# Patient Record
Sex: MALE | Race: BLACK OR AFRICAN AMERICAN | Employment: OTHER | URBAN - NONMETROPOLITAN AREA
[De-identification: names, ages, dates, MRNs, and addresses within clinical notes are randomized per-mention and may not be internally consistent; named-entity substitution may affect disease eponyms.]

---

## 2023-01-24 ENCOUNTER — HOSPITAL ENCOUNTER (EMERGENCY)
Age: 73
Discharge: HOME | End: 2023-01-24
Attending: FAMILY MEDICINE
Payer: OTHER GOVERNMENT

## 2023-01-24 ENCOUNTER — APPOINTMENT (OUTPATIENT)
Dept: GENERAL RADIOLOGY | Age: 73
End: 2023-01-24
Attending: FAMILY MEDICINE
Payer: OTHER GOVERNMENT

## 2023-01-24 VITALS
OXYGEN SATURATION: 95 % | DIASTOLIC BLOOD PRESSURE: 95 MMHG | HEIGHT: 74 IN | RESPIRATION RATE: 20 BRPM | HEART RATE: 106 BPM | TEMPERATURE: 97.9 F | WEIGHT: 173 LBS | SYSTOLIC BLOOD PRESSURE: 163 MMHG | BODY MASS INDEX: 22.2 KG/M2

## 2023-01-24 DIAGNOSIS — R09.82 POST-NASAL DRIP: Primary | ICD-10-CM

## 2023-01-24 DIAGNOSIS — J04.0 LARYNGITIS: ICD-10-CM

## 2023-01-24 PROCEDURE — 99283 EMERGENCY DEPT VISIT LOW MDM: CPT

## 2023-01-24 PROCEDURE — 71045 X-RAY EXAM CHEST 1 VIEW: CPT

## 2023-01-24 RX ORDER — AMLODIPINE BESYLATE 10 MG/1
10 TABLET ORAL DAILY
COMMUNITY

## 2023-01-24 RX ORDER — FLUTICASONE PROPIONATE 50 MCG
2 SPRAY, SUSPENSION (ML) NASAL DAILY
Qty: 16 G | Refills: 0 | Status: SHIPPED | OUTPATIENT
Start: 2023-01-24

## 2023-01-25 NOTE — ED PROVIDER NOTES
EMERGENCY DEPARTMENT HISTORY AND PHYSICAL EXAM      Date: 1/24/2023  Patient Name: Curt Mayfield    History of Presenting Illness     Chief Complaint   Patient presents with    Cold Symptoms       History Provided By: Patient    HPI: Curt Mayfield, 71 y.o. male with a past medical history significant hypertension and afib on eliquis  presents to the ED with cc of cold symptoms. Reports cold symptoms for the last couple of weeks. Mainly hoarseness and losing his voice. No sore throat, no fever. Denies N/V/D. Appetite is good, does report fatigue has been taking OTC meds with no relief. 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 He has atrial fibrillation but takes medications for this. Denies any chest pain, palpitations, shortness of breath. Does occasionally get winded with physical exertion but that is nothing new. Does feel like he gets postnasal drip and that this happens more so when he is laying down and sleeping at night. Does not take any medications for this. No recent travel or exposure to sick people. No fevers. No numbness, tingling, headaches, falls. Otherwise feels great for his age. There are no other complaints, changes, or physical findings at this time. PCP: None    No current facility-administered medications on file prior to encounter. Current Outpatient Medications on File Prior to Encounter   Medication Sig Dispense Refill    apixaban (Eliquis) 5 mg tablet Take 5 mg by mouth two (2) times a day. amLODIPine (NORVASC) 10 mg tablet Take 10 mg by mouth daily. Past History     Past Medical History:  Past Medical History:   Diagnosis Date    A-fib ([facility] [address]     HTN (hypertension)        Past Surgical History:  No past surgical history on file. Family History:  No family history on file.     Social History:  Social History     Tobacco Use    Smoking status: Every Day     Packs/day: 0.25     Types: Cigarettes Smokeless tobacco: Never       Allergies:  No Known Allergies      Review of Systems   ROS as stated above in HPI and is otherwise negative. Review of Systems    Physical Exam   GENERAL: Afebrile. Alert and oriented x 3. No acute distress. Well-nourished. EYES: EOMI. Anicteric. HENT: Moist mucous membranes. No scleral icterus. No cervical lymphadenopathy. Voice a bit hoarse. Some posterior erythema consistent with post nasal drip. LUNGS: Chest rise and fall symmetric. O2 sat 96% on room air. Clear to auscultation bilaterally. No accessory muscle use. CARDIOVASCULAR: Regular rate and rhythm. No murmur. No JVD. ABDOMEN: Soft, non-tender and non-distended. No palpable masses. EXTREMITIES: No edema. Non-tender. SKIN: No rashes or lesions. Warm. NEUROLOGIC: No focal neurological deficits. CN II-XII grossly intact bilaterally. PSYCHIATRIC: Cooperative. Appropriate mood and affect. Physical Exam    Lab and Diagnostic Study Results     Labs -   No results found for this or any previous visit (from the past 12 hour(s)). Radiologic Studies -   @lastxrresult@  CT Results  (Last 48 hours)      None          CXR Results  (Last 48 hours)                 01/24/23 2104  XR CHEST PORT Final result    Impression:  No acute cardiopulmonary process seen       Narrative:  EXAM: XR CHEST PORT       INDICATION: hoarseness, cold       COMPARISON: 4/17/2020       FINDINGS: A portable AP radiograph of the chest was obtained at 2102 hours. The   right apical bulla has remained stable. The lungs are clear. The cardiac and   mediastinal contours and pulmonary vascularity are normal.  The bones and soft   tissues are grossly within normal limits. Medical Decision Making   - I am the first provider for this patient. - I reviewed the vital signs, available nursing notes, past medical history, past surgical history, family history and social history. - Initial assessment performed.  The patients presenting problems have been discussed, and they are in agreement with the care plan formulated and outlined with them. I have encouraged them to ask questions as they arise throughout their visit. Vital Signs-Reviewed the patient's vital signs. Patient Vitals for the past 12 hrs:   Temp Pulse Resp BP SpO2   01/24/23 2026 97.9 °F (36.6 °C) (!) 106 20 (!) 163/95 95 %       Records Reviewed: Nursing Notes and Old Medical Records    The patient presents with hoarseness with a differential diagnosis of laryngitis, pneumonia, GERD, asthma, bronchitis      ED Course:     ED Course as of 01/25/23 0516 Tue Jan 24, 2023 2128 EXAM: XR CHEST PORT     INDICATION: hoarseness, cold     COMPARISON: 4/17/2020     FINDINGS: A portable AP radiograph of the chest was obtained at 2102 hours. The  right apical bulla has remained stable. The lungs are clear. The cardiac and  mediastinal contours and pulmonary vascularity are normal.  The bones and soft  tissues are grossly within normal limits. IMPRESSION  No acute cardiopulmonary process seen [OM]      ED Course User Index  [OM] [place] Small DO       Provider Notes (Medical Decision Making):   80-year-old male with history of A. fib and otherwise well for his age presents ER with chief complaint of some fatigue and voice hoarseness. He has stable vital signs and a reassuring exam with signs of postnasal drip and some sinus inflammation. His chest x-ray shows no acute findings and he is counseled on postnasal drip. He shows no signs of ACS, CHF, A. fib with RVR. He states he otherwise feels great for his age. He is given positive reassurance and supportive measures for postnasal drip and viral sinusitis are discussed. He is started on a Nasonex nasal inhaler and directions for how to use are discussed with him.   He is also advised to follow-up with primary care provider and ensure he follows up with his cardiologist.  [address] Decision Making  Performed by: Regan Mccormick DO  PROCEDURES:  Procedures       Disposition   Disposition: Condition stable  DC-The patient was given verbal follow-up instructions    Discharged    DISCHARGE PLAN:  1. Current Discharge Medication List        CONTINUE these medications which have NOT CHANGED    Details   apixaban (Eliquis) 5 mg tablet Take 5 mg by mouth two (2) times a day. amLODIPine (NORVASC) 10 mg tablet Take 10 mg by mouth daily. 2.   Follow-up Information       Follow up With Specialties Details Why Contact [facility] EMERGENCY DEPT Emergency Medicine  As needed, If symptoms worsen [facility] [phone] [phone]          3. Return to ED if worse   4. Discharge Medication List as of 1/24/2023  9:37 PM        START taking these medications    Details   fluticasone propionate (FLONASE) 50 mcg/actuation nasal spray 2 Sprays by Both Nostrils route daily. , Normal, Disp-16 g, R-0           CONTINUE these medications which have NOT CHANGED    Details   apixaban (Eliquis) 5 mg tablet Take 5 mg by mouth two (2) times a day., Historical Med      amLODIPine (NORVASC) 10 mg tablet Take 10 mg by mouth daily. , Historical Med               Diagnosis     Clinical Impression:   1. Post-nasal drip    2. Laryngitis        Attestations:    Regan Mccormick DO    Please note that this dictation was completed with [employer], the computer voice recognition software. Quite often unanticipated grammatical, syntax, homophones, and other interpretive errors are inadvertently transcribed by the computer software. Please disregard these errors. Please excuse any errors that have escaped final proofreading. Thank you.

## 2023-01-25 NOTE — ED TRIAGE NOTES
Reports cold symptoms for the last couple of weeks. Mainly hoarseness and losing his voice. No sore throat, no fever. Denies N/V/D.  Appetite is good, does report fatigue has been taking OTC meds with no relief

## 2024-01-22 ENCOUNTER — APPOINTMENT (OUTPATIENT)
Age: 74
End: 2024-01-22
Payer: MEDICARE

## 2024-01-22 ENCOUNTER — HOSPITAL ENCOUNTER (EMERGENCY)
Age: 74
Discharge: HOME | End: 2024-01-22
Attending: EMERGENCY MEDICINE
Payer: MEDICARE

## 2024-01-22 VITALS
SYSTOLIC BLOOD PRESSURE: 145 MMHG | HEIGHT: 74 IN | RESPIRATION RATE: 13 BRPM | TEMPERATURE: 98.1 F | OXYGEN SATURATION: 100 % | DIASTOLIC BLOOD PRESSURE: 92 MMHG | WEIGHT: 134 LBS | BODY MASS INDEX: 17.2 KG/M2 | HEART RATE: 94 BPM

## 2024-01-22 DIAGNOSIS — R16.0 LIVER MASS: ICD-10-CM

## 2024-01-22 DIAGNOSIS — R07.9 CHEST PAIN, UNSPECIFIED TYPE: Primary | ICD-10-CM

## 2024-01-22 DIAGNOSIS — C34.90 PRIMARY MALIGNANT NEOPLASM OF LUNG METASTATIC TO OTHER SITE, UNSPECIFIED LATERALITY (HCC): ICD-10-CM

## 2024-01-22 DIAGNOSIS — R91.8 HILAR MASS: ICD-10-CM

## 2024-01-22 LAB
ANION GAP SERPL CALC-SCNC: 8 MMOL/L (ref 3–18)
BASOPHILS # BLD: 0 K/UL (ref 0–0.1)
BASOPHILS NFR BLD: 0 % (ref 0–2)
BUN SERPL-MCNC: 11 MG/DL (ref 7–18)
BUN/CREAT SERPL: 11 (ref 12–20)
CA-I BLD-MCNC: 10.7 MG/DL (ref 8.5–10.1)
CHLORIDE SERPL-SCNC: 103 MMOL/L (ref 100–111)
CO2 SERPL-SCNC: 27 MMOL/L (ref 21–32)
CREAT SERPL-MCNC: 0.96 MG/DL (ref 0.6–1.3)
D DIMER PPP FEU-MCNC: 1.08 UG/ML(FEU)
DIFFERENTIAL METHOD BLD: ABNORMAL
EOSINOPHIL # BLD: 0 K/UL (ref 0–0.4)
EOSINOPHIL NFR BLD: 0 % (ref 0–5)
ERYTHROCYTE [DISTWIDTH] IN BLOOD BY AUTOMATED COUNT: 18.8 % (ref 11.6–14.5)
GLUCOSE SERPL-MCNC: 103 MG/DL (ref 74–99)
HCT VFR BLD AUTO: 40.5 % (ref 36–48)
HGB BLD-MCNC: 12.6 G/DL (ref 13–16)
IMM GRANULOCYTES # BLD AUTO: 0 K/UL (ref 0–0.04)
IMM GRANULOCYTES NFR BLD AUTO: 0 % (ref 0–0.5)
LYMPHOCYTES # BLD: 1.3 K/UL (ref 0.9–3.6)
LYMPHOCYTES NFR BLD: 16 % (ref 21–52)
MCH RBC QN AUTO: 27.2 PG (ref 24–34)
MCHC RBC AUTO-ENTMCNC: 31.1 G/DL (ref 31–37)
MCV RBC AUTO: 87.5 FL (ref 78–100)
MONOCYTES # BLD: 0.9 K/UL (ref 0.05–1.2)
MONOCYTES NFR BLD: 11 % (ref 3–10)
NEUTS SEG # BLD: 5.9 K/UL (ref 1.8–8)
NEUTS SEG NFR BLD: 73 % (ref 40–73)
NRBC # BLD: 0 K/UL (ref 0–0.01)
NRBC BLD-RTO: 0 PER 100 WBC
PLATELET # BLD AUTO: 304 K/UL (ref 135–420)
PMV BLD AUTO: 8.5 FL (ref 9.2–11.8)
POTASSIUM SERPL-SCNC: 3.9 MMOL/L (ref 3.5–5.5)
RBC # BLD AUTO: 4.63 M/UL (ref 4.35–5.65)
SODIUM SERPL-SCNC: 138 MMOL/L (ref 136–145)
TROPONIN I SERPL HS-MCNC: 7 NG/L (ref 0–78)
TROPONIN I SERPL HS-MCNC: 8 NG/L (ref 0–78)
WBC # BLD AUTO: 8.2 K/UL (ref 4.6–13.2)

## 2024-01-22 PROCEDURE — 71275 CT ANGIOGRAPHY CHEST: CPT

## 2024-01-22 PROCEDURE — 85025 COMPLETE CBC W/AUTO DIFF WBC: CPT

## 2024-01-22 PROCEDURE — 80048 BASIC METABOLIC PNL TOTAL CA: CPT

## 2024-01-22 PROCEDURE — 85379 FIBRIN DEGRADATION QUANT: CPT

## 2024-01-22 PROCEDURE — 71045 X-RAY EXAM CHEST 1 VIEW: CPT

## 2024-01-22 PROCEDURE — 6360000004 HC RX CONTRAST MEDICATION: Performed by: EMERGENCY MEDICINE

## 2024-01-22 PROCEDURE — 6370000000 HC RX 637 (ALT 250 FOR IP): Performed by: EMERGENCY MEDICINE

## 2024-01-22 PROCEDURE — 36415 COLL VENOUS BLD VENIPUNCTURE: CPT

## 2024-01-22 PROCEDURE — 99285 EMERGENCY DEPT VISIT HI MDM: CPT

## 2024-01-22 PROCEDURE — 2580000003 HC RX 258: Performed by: EMERGENCY MEDICINE

## 2024-01-22 PROCEDURE — 93005 ELECTROCARDIOGRAM TRACING: CPT | Performed by: EMERGENCY MEDICINE

## 2024-01-22 PROCEDURE — 84484 ASSAY OF TROPONIN QUANT: CPT

## 2024-01-22 PROCEDURE — 74177 CT ABD & PELVIS W/CONTRAST: CPT

## 2024-01-22 RX ORDER — BENZONATATE 100 MG/1
100 CAPSULE ORAL 3 TIMES DAILY PRN
COMMUNITY

## 2024-01-22 RX ORDER — MIRTAZAPINE 30 MG/1
30 TABLET, FILM COATED ORAL NIGHTLY
COMMUNITY

## 2024-01-22 RX ORDER — MAGNESIUM HYDROXIDE/ALUMINUM HYDROXICE/SIMETHICONE 120; 1200; 1200 MG/30ML; MG/30ML; MG/30ML
30 SUSPENSION ORAL
Status: COMPLETED | OUTPATIENT
Start: 2024-01-22 | End: 2024-01-22

## 2024-01-22 RX ORDER — ATORVASTATIN CALCIUM 80 MG/1
80 TABLET, FILM COATED ORAL DAILY
COMMUNITY

## 2024-01-22 RX ORDER — SODIUM CHLORIDE, SODIUM LACTATE, POTASSIUM CHLORIDE, AND CALCIUM CHLORIDE .6; .31; .03; .02 G/100ML; G/100ML; G/100ML; G/100ML
1000 INJECTION, SOLUTION INTRAVENOUS ONCE
Status: COMPLETED | OUTPATIENT
Start: 2024-01-22 | End: 2024-01-22

## 2024-01-22 RX ORDER — AMLODIPINE BESYLATE 10 MG/1
10 TABLET ORAL DAILY
COMMUNITY

## 2024-01-22 RX ADMIN — SODIUM CHLORIDE, POTASSIUM CHLORIDE, SODIUM LACTATE AND CALCIUM CHLORIDE 1000 ML: 600; 310; 30; 20 INJECTION, SOLUTION INTRAVENOUS at 18:05

## 2024-01-22 RX ADMIN — IOPAMIDOL 95 ML: 755 INJECTION, SOLUTION INTRAVENOUS at 18:08

## 2024-01-22 RX ADMIN — ALUMINUM HYDROXIDE, MAGNESIUM HYDROXIDE, AND SIMETHICONE 30 ML: 200; 200; 20 SUSPENSION ORAL at 19:02

## 2024-01-22 ASSESSMENT — PAIN SCALES - GENERAL: PAINLEVEL_OUTOF10: 6

## 2024-01-22 NOTE — ED TRIAGE NOTES
Patient reports chest pain, continuous, for one week. Reports that he has lung and liver cancer with last chemotherapy was 1/18/24

## 2024-01-22 NOTE — ED PROVIDER NOTES
[facility] EMERGENCY DEPT  EMERGENCY DEPARTMENT ENCOUNTER      Pt Name: Javi Carbajal  MRN: [id number]  Birthdate 1950  Date of evaluation: 1/22/2024  Provider: Roel Medellin MD  8:41 AM    CHIEF COMPLAINT       Chief Complaint   Patient presents with    Chest Pain     For a week         HISTORY OF PRESENT ILLNESS    Javi Carbajal is a 73 y.o. male with past medical history significant for metastatic lung cancer on palliation chemotherapy at the [facility] with last infusion on January 18 who presents to the emergency department for evaluation of chest discomfort for the last 1 week.  Gradual onset, atraumatic nonradiating.  No positional or exertion association, food or drug association.  No clear aggravating factors.  No change to the character or severity.    The history is provided by the patient, the spouse and medical records.       Nursing Notes were reviewed.    REVIEW OF SYSTEMS       Review of Systems   All other systems reviewed and are negative.      Except as noted above the remainder of the review of systems was reviewed and negative.       PAST MEDICAL HISTORY     Past Medical History:   Diagnosis Date    A-fib (HCC)     HTN (hypertension)     Liver cancer (HCC)     Lung cancer (HCC)          SURGICAL HISTORY     History reviewed. No pertinent surgical history.      CURRENT MEDICATIONS       Discharge Medication List as of 1/22/2024  7:58 PM        CONTINUE these medications which have NOT CHANGED    Details   atorvastatin (LIPITOR) 80 MG tablet Take 1 tablet by mouth dailyHistorical Med      benzonatate (TESSALON) 100 MG capsule Take 1 capsule by mouth 3 times daily as needed for CoughHistorical Med      apixaban (ELIQUIS) 5 MG TABS tablet Take 1 tablet by mouth 2 times dailyHistorical Med      amLODIPine (NORVASC) 10 MG tablet Take 1 tablet by mouth dailyHistorical Med      mirtazapine (REMERON) 30 MG tablet Take 1 tablet by mouth nightlyHistorical Med             ALLERGIES     Patient has no

## 2024-01-22 NOTE — DISCHARGE INSTRUCTIONS
You had a CBC, basic metabolic panel and to blood test for your heart as well as a CT of the chest abdomen pelvis.  We have provided you a copy of the CTs on a CD to share with both your oncologist and radiologist at the .

## 2024-01-23 LAB
EKG ATRIAL RATE: 116 BPM
EKG DIAGNOSIS: NORMAL
EKG P AXIS: 73 DEGREES
EKG P-R INTERVAL: 128 MS
EKG Q-T INTERVAL: 300 MS
EKG QRS DURATION: 74 MS
EKG QTC CALCULATION (BAZETT): 417 MS
EKG R AXIS: 74 DEGREES
EKG T AXIS: 4 DEGREES
EKG VENTRICULAR RATE: 116 BPM

## 2024-01-23 NOTE — ED NOTES
Assumed care of pt resting on stretcher using personal electronic device . Pt medicated  by previous nurse cardiac monitor in place. Pt reports mid sternal CP 6/10 after medication. 1L NS bolus infusing. Pt ambulatory to restroom voids freely w/o incident.

## 2024-02-02 ENCOUNTER — HOSPITAL ENCOUNTER (INPATIENT)
Age: 74
LOS: 5 days | Discharge: HOME | DRG: 871 | End: 2024-02-07
Attending: FAMILY MEDICINE | Admitting: INTERNAL MEDICINE
Payer: MEDICARE

## 2024-02-02 ENCOUNTER — APPOINTMENT (OUTPATIENT)
Age: 74
DRG: 871 | End: 2024-02-02
Payer: MEDICARE

## 2024-02-02 DIAGNOSIS — A41.9 SEPTICEMIA (HCC): ICD-10-CM

## 2024-02-02 DIAGNOSIS — J18.9 PNEUMONIA DUE TO INFECTIOUS ORGANISM, UNSPECIFIED LATERALITY, UNSPECIFIED PART OF LUNG: Primary | ICD-10-CM

## 2024-02-02 DIAGNOSIS — I47.10 PAROXYSMAL SUPRAVENTRICULAR TACHYCARDIA: ICD-10-CM

## 2024-02-02 DIAGNOSIS — E87.6 HYPOKALEMIA: ICD-10-CM

## 2024-02-02 DIAGNOSIS — I48.21 PERMANENT ATRIAL FIBRILLATION (HCC): ICD-10-CM

## 2024-02-02 DIAGNOSIS — Z85.05 HISTORY OF LIVER CANCER: ICD-10-CM

## 2024-02-02 DIAGNOSIS — Z85.118 HISTORY OF LUNG CANCER: ICD-10-CM

## 2024-02-02 LAB
ALBUMIN SERPL-MCNC: 2.6 G/DL (ref 3.4–5)
ALBUMIN/GLOB SERPL: 0.5 (ref 0.8–1.7)
ALP SERPL-CCNC: 68 U/L (ref 45–117)
ALT SERPL-CCNC: 12 U/L (ref 16–61)
AMPHET UR QL SCN: NEGATIVE
ANION GAP SERPL CALC-SCNC: 9 MMOL/L (ref 3–18)
APPEARANCE UR: CLEAR
AST SERPL W P-5'-P-CCNC: 20 U/L (ref 10–38)
BACTERIA URNS QL MICRO: ABNORMAL /HPF
BARBITURATES UR QL SCN: NEGATIVE
BASOPHILS # BLD: 0 K/UL (ref 0–0.1)
BASOPHILS NFR BLD: 0 % (ref 0–2)
BENZODIAZ UR QL: NEGATIVE
BILIRUB SERPL-MCNC: 0.8 MG/DL (ref 0.2–1)
BILIRUB UR QL: NEGATIVE
BUN SERPL-MCNC: 17 MG/DL (ref 7–18)
BUN/CREAT SERPL: 18 (ref 12–20)
CA-I BLD-MCNC: 10 MG/DL (ref 8.5–10.1)
CANNABINOIDS UR QL SCN: NEGATIVE
CHLORIDE SERPL-SCNC: 101 MMOL/L (ref 100–111)
CO2 SERPL-SCNC: 28 MMOL/L (ref 21–32)
COCAINE UR QL SCN: NEGATIVE
COLOR UR: YELLOW
CREAT SERPL-MCNC: 0.94 MG/DL (ref 0.6–1.3)
D DIMER PPP FEU-MCNC: 2.52 UG/ML(FEU)
DIFFERENTIAL METHOD BLD: ABNORMAL
EKG ATRIAL RATE: 120 BPM
EKG ATRIAL RATE: 159 BPM
EKG DIAGNOSIS: NORMAL
EKG DIAGNOSIS: NORMAL
EKG P AXIS: 52 DEGREES
EKG P AXIS: 88 DEGREES
EKG P-R INTERVAL: 144 MS
EKG P-R INTERVAL: 182 MS
EKG Q-T INTERVAL: 268 MS
EKG Q-T INTERVAL: 322 MS
EKG QRS DURATION: 80 MS
EKG QRS DURATION: 82 MS
EKG QTC CALCULATION (BAZETT): 435 MS
EKG QTC CALCULATION (BAZETT): 455 MS
EKG R AXIS: 76 DEGREES
EKG R AXIS: 78 DEGREES
EKG T AXIS: -42 DEGREES
EKG T AXIS: 36 DEGREES
EKG VENTRICULAR RATE: 120 BPM
EKG VENTRICULAR RATE: 159 BPM
EOSINOPHIL # BLD: 0.1 K/UL (ref 0–0.4)
EOSINOPHIL NFR BLD: 1 % (ref 0–5)
EPITH CASTS URNS QL MICRO: ABNORMAL /LPF (ref 0–20)
ERYTHROCYTE [DISTWIDTH] IN BLOOD BY AUTOMATED COUNT: 19.5 % (ref 11.6–14.5)
FENTANYL UR QL SCN: NEGATIVE
FLUAV AG NPH QL IA: NEGATIVE
FLUBV AG NOSE QL IA: NEGATIVE
GLOBULIN SER CALC-MCNC: 5 G/DL (ref 2–4)
GLUCOSE SERPL-MCNC: 120 MG/DL (ref 74–99)
GLUCOSE UR STRIP.AUTO-MCNC: NEGATIVE MG/DL
HCT VFR BLD AUTO: 44.6 % (ref 36–48)
HGB BLD-MCNC: 14.5 G/DL (ref 13–16)
HGB UR QL STRIP: NEGATIVE
HYALINE CASTS URNS QL MICRO: ABNORMAL /LPF
IMM GRANULOCYTES # BLD AUTO: 0 K/UL (ref 0–0.04)
IMM GRANULOCYTES NFR BLD AUTO: 0 % (ref 0–0.5)
KETONES UR QL STRIP.AUTO: NEGATIVE MG/DL
LACTATE SERPL-SCNC: 1.9 MMOL/L (ref 0.4–2)
LACTATE SERPL-SCNC: 2.5 MMOL/L (ref 0.4–2)
LEUKOCYTE ESTERASE UR QL STRIP.AUTO: NEGATIVE
LYMPHOCYTES # BLD: 0.3 K/UL (ref 0.9–3.6)
LYMPHOCYTES NFR BLD: 2 % (ref 21–52)
Lab: NORMAL
MAGNESIUM SERPL-MCNC: 1.6 MG/DL (ref 1.6–2.6)
MCH RBC QN AUTO: 28 PG (ref 24–34)
MCHC RBC AUTO-ENTMCNC: 32.5 G/DL (ref 31–37)
MCV RBC AUTO: 86.1 FL (ref 78–100)
METHADONE UR QL: NEGATIVE
MONOCYTES # BLD: 0.7 K/UL (ref 0.05–1.2)
MONOCYTES NFR BLD: 6 % (ref 3–10)
NEUTS SEG # BLD: 11.3 K/UL (ref 1.8–8)
NEUTS SEG NFR BLD: 91 % (ref 40–73)
NITRITE UR QL STRIP.AUTO: NEGATIVE
NRBC # BLD: 0 K/UL (ref 0–0.01)
NRBC BLD-RTO: 0 PER 100 WBC
OPIATES UR QL: NEGATIVE
OXYCODONE UR QL SCN: NEGATIVE
PCP UR QL: NEGATIVE
PH UR STRIP: 6.5 (ref 5–8)
PLATELET # BLD AUTO: 418 K/UL (ref 135–420)
PMV BLD AUTO: 9.6 FL (ref 9.2–11.8)
POTASSIUM SERPL-SCNC: 3.3 MMOL/L (ref 3.5–5.5)
PROCALCITONIN SERPL-MCNC: 3.13 NG/ML
PROPOXYPH UR QL: NEGATIVE
PROT SERPL-MCNC: 7.6 G/DL (ref 6.4–8.2)
PROT UR STRIP-MCNC: 30 MG/DL
RBC # BLD AUTO: 5.18 M/UL (ref 4.35–5.65)
RBC #/AREA URNS HPF: ABNORMAL /HPF (ref 0–2)
SARS-COV-2 RDRP RESP QL NAA+PROBE: NOT DETECTED
SODIUM SERPL-SCNC: 138 MMOL/L (ref 136–145)
SP GR UR REFRACTOMETRY: 1.01 (ref 1–1.03)
TRICYCLICS UR QL: NEGATIVE
TROPONIN I SERPL HS-MCNC: 39 NG/L (ref 0–78)
TROPONIN I SERPL HS-MCNC: 45 NG/L (ref 0–78)
UROBILINOGEN UR QL STRIP.AUTO: 1 EU/DL (ref 0.2–1)
WBC # BLD AUTO: 12.5 K/UL (ref 4.6–13.2)
WBC URNS QL MICRO: ABNORMAL /HPF (ref 0–4)

## 2024-02-02 PROCEDURE — 2500000003 HC RX 250 WO HCPCS

## 2024-02-02 PROCEDURE — 6360000004 HC RX CONTRAST MEDICATION: Performed by: FAMILY MEDICINE

## 2024-02-02 PROCEDURE — 84146 ASSAY OF PROLACTIN: CPT

## 2024-02-02 PROCEDURE — 96375 TX/PRO/DX INJ NEW DRUG ADDON: CPT

## 2024-02-02 PROCEDURE — 83735 ASSAY OF MAGNESIUM: CPT

## 2024-02-02 PROCEDURE — 87635 SARS-COV-2 COVID-19 AMP PRB: CPT

## 2024-02-02 PROCEDURE — 2500000003 HC RX 250 WO HCPCS: Performed by: FAMILY MEDICINE

## 2024-02-02 PROCEDURE — 2700000000 HC OXYGEN THERAPY PER DAY

## 2024-02-02 PROCEDURE — 96366 THER/PROPH/DIAG IV INF ADDON: CPT

## 2024-02-02 PROCEDURE — 6360000002 HC RX W HCPCS

## 2024-02-02 PROCEDURE — 83605 ASSAY OF LACTIC ACID: CPT

## 2024-02-02 PROCEDURE — 84145 PROCALCITONIN (PCT): CPT

## 2024-02-02 PROCEDURE — 2580000003 HC RX 258: Performed by: NURSE PRACTITIONER

## 2024-02-02 PROCEDURE — 71045 X-RAY EXAM CHEST 1 VIEW: CPT

## 2024-02-02 PROCEDURE — 5A0935A ASSISTANCE WITH RESPIRATORY VENTILATION, LESS THAN 24 CONSECUTIVE HOURS, HIGH NASAL FLOW/VELOCITY: ICD-10-PCS | Performed by: FAMILY MEDICINE

## 2024-02-02 PROCEDURE — 87804 INFLUENZA ASSAY W/OPTIC: CPT

## 2024-02-02 PROCEDURE — 96376 TX/PRO/DX INJ SAME DRUG ADON: CPT

## 2024-02-02 PROCEDURE — 6360000002 HC RX W HCPCS: Performed by: NURSE PRACTITIONER

## 2024-02-02 PROCEDURE — 2000000000 HC ICU R&B

## 2024-02-02 PROCEDURE — 85379 FIBRIN DEGRADATION QUANT: CPT

## 2024-02-02 PROCEDURE — 93005 ELECTROCARDIOGRAM TRACING: CPT | Performed by: FAMILY MEDICINE

## 2024-02-02 PROCEDURE — 71275 CT ANGIOGRAPHY CHEST: CPT

## 2024-02-02 PROCEDURE — 36556 INSERT NON-TUNNEL CV CATH: CPT

## 2024-02-02 PROCEDURE — 2580000003 HC RX 258: Performed by: FAMILY MEDICINE

## 2024-02-02 PROCEDURE — 80053 COMPREHEN METABOLIC PANEL: CPT

## 2024-02-02 PROCEDURE — 80307 DRUG TEST PRSMV CHEM ANLYZR: CPT

## 2024-02-02 PROCEDURE — 6360000002 HC RX W HCPCS: Performed by: FAMILY MEDICINE

## 2024-02-02 PROCEDURE — 96365 THER/PROPH/DIAG IV INF INIT: CPT

## 2024-02-02 PROCEDURE — 5A2204Z RESTORATION OF CARDIAC RHYTHM, SINGLE: ICD-10-PCS | Performed by: FAMILY MEDICINE

## 2024-02-02 PROCEDURE — 85025 COMPLETE CBC W/AUTO DIFF WBC: CPT

## 2024-02-02 PROCEDURE — 99291 CRITICAL CARE FIRST HOUR: CPT

## 2024-02-02 PROCEDURE — 84484 ASSAY OF TROPONIN QUANT: CPT

## 2024-02-02 PROCEDURE — 02HV33Z INSERTION OF INFUSION DEVICE INTO SUPERIOR VENA CAVA, PERCUTANEOUS APPROACH: ICD-10-PCS | Performed by: FAMILY MEDICINE

## 2024-02-02 PROCEDURE — 87040 BLOOD CULTURE FOR BACTERIA: CPT

## 2024-02-02 PROCEDURE — 6370000000 HC RX 637 (ALT 250 FOR IP): Performed by: NURSE PRACTITIONER

## 2024-02-02 PROCEDURE — 94761 N-INVAS EAR/PLS OXIMETRY MLT: CPT

## 2024-02-02 PROCEDURE — 81001 URINALYSIS AUTO W/SCOPE: CPT

## 2024-02-02 RX ORDER — ADENOSINE 3 MG/ML
6 INJECTION, SOLUTION INTRAVENOUS ONCE
Status: COMPLETED | OUTPATIENT
Start: 2024-02-02 | End: 2024-02-02

## 2024-02-02 RX ORDER — METOPROLOL TARTRATE 1 MG/ML
INJECTION, SOLUTION INTRAVENOUS
Status: COMPLETED
Start: 2024-02-02 | End: 2024-02-02

## 2024-02-02 RX ORDER — 0.9 % SODIUM CHLORIDE 0.9 %
500 INTRAVENOUS SOLUTION INTRAVENOUS ONCE
Status: COMPLETED | OUTPATIENT
Start: 2024-02-02 | End: 2024-02-02

## 2024-02-02 RX ORDER — DILTIAZEM HYDROCHLORIDE 5 MG/ML
10 INJECTION INTRAVENOUS ONCE
Status: COMPLETED | OUTPATIENT
Start: 2024-02-02 | End: 2024-02-02

## 2024-02-02 RX ORDER — ADENOSINE 3 MG/ML
12 INJECTION, SOLUTION INTRAVENOUS ONCE
Status: COMPLETED | OUTPATIENT
Start: 2024-02-02 | End: 2024-02-02

## 2024-02-02 RX ORDER — MIRTAZAPINE 15 MG/1
30 TABLET, FILM COATED ORAL NIGHTLY
Status: DISCONTINUED | OUTPATIENT
Start: 2024-02-02 | End: 2024-02-07 | Stop reason: HOSPADM

## 2024-02-02 RX ORDER — PROPOFOL 10 MG/ML
60 INJECTION, EMULSION INTRAVENOUS
Status: DISCONTINUED | OUTPATIENT
Start: 2024-02-02 | End: 2024-02-02

## 2024-02-02 RX ORDER — ATORVASTATIN CALCIUM 40 MG/1
80 TABLET, FILM COATED ORAL DAILY
Status: DISCONTINUED | OUTPATIENT
Start: 2024-02-02 | End: 2024-02-07 | Stop reason: HOSPADM

## 2024-02-02 RX ORDER — ADENOSINE 3 MG/ML
INJECTION, SOLUTION INTRAVENOUS
Status: COMPLETED
Start: 2024-02-02 | End: 2024-02-02

## 2024-02-02 RX ORDER — ACETAMINOPHEN 325 MG/1
650 TABLET ORAL EVERY 4 HOURS PRN
Status: DISCONTINUED | OUTPATIENT
Start: 2024-02-02 | End: 2024-02-07 | Stop reason: HOSPADM

## 2024-02-02 RX ORDER — BENZONATATE 100 MG/1
100 CAPSULE ORAL 3 TIMES DAILY PRN
Status: DISCONTINUED | OUTPATIENT
Start: 2024-02-02 | End: 2024-02-07 | Stop reason: HOSPADM

## 2024-02-02 RX ORDER — SODIUM CHLORIDE AND POTASSIUM CHLORIDE 150; 900 MG/100ML; MG/100ML
INJECTION, SOLUTION INTRAVENOUS CONTINUOUS
Status: DISCONTINUED | OUTPATIENT
Start: 2024-02-02 | End: 2024-02-07 | Stop reason: HOSPADM

## 2024-02-02 RX ORDER — METOPROLOL TARTRATE 1 MG/ML
5 INJECTION, SOLUTION INTRAVENOUS
Status: COMPLETED | OUTPATIENT
Start: 2024-02-02 | End: 2024-02-02

## 2024-02-02 RX ADMIN — POTASSIUM CHLORIDE AND SODIUM CHLORIDE: 900; 150 INJECTION, SOLUTION INTRAVENOUS at 18:31

## 2024-02-02 RX ADMIN — PIPERACILLIN AND TAZOBACTAM 3375 MG: 3; .375 INJECTION, POWDER, FOR SOLUTION INTRAVENOUS at 22:59

## 2024-02-02 RX ADMIN — METOPROLOL TARTRATE 5 MG: 5 INJECTION INTRAVENOUS at 13:42

## 2024-02-02 RX ADMIN — ADENOSINE 6 MG: 3 INJECTION, SOLUTION INTRAVENOUS at 13:16

## 2024-02-02 RX ADMIN — ADENOSINE 6 MG: 3 INJECTION, SOLUTION INTRAVENOUS at 12:40

## 2024-02-02 RX ADMIN — MIRTAZAPINE 30 MG: 15 TABLET, FILM COATED ORAL at 20:57

## 2024-02-02 RX ADMIN — ADENOSINE 12 MG: 3 INJECTION, SOLUTION INTRAVENOUS at 12:48

## 2024-02-02 RX ADMIN — ADENOSINE 12 MG: 3 INJECTION, SOLUTION INTRAVENOUS at 13:16

## 2024-02-02 RX ADMIN — SODIUM CHLORIDE 500 ML: 9 INJECTION, SOLUTION INTRAVENOUS at 16:15

## 2024-02-02 RX ADMIN — SODIUM CHLORIDE 500 ML: 9 INJECTION, SOLUTION INTRAVENOUS at 14:05

## 2024-02-02 RX ADMIN — AMIODARONE HYDROCHLORIDE 150 MG: 50 INJECTION, SOLUTION INTRAVENOUS at 16:59

## 2024-02-02 RX ADMIN — METOPROLOL TARTRATE 25 MG: 25 TABLET, FILM COATED ORAL at 20:57

## 2024-02-02 RX ADMIN — Medication 12.5 MG/HR: at 22:19

## 2024-02-02 RX ADMIN — SODIUM CHLORIDE 500 ML: 9 INJECTION, SOLUTION INTRAVENOUS at 12:38

## 2024-02-02 RX ADMIN — AMIODARONE HYDROCHLORIDE 1 MG/MIN: 50 INJECTION, SOLUTION INTRAVENOUS at 17:15

## 2024-02-02 RX ADMIN — DILTIAZEM HYDROCHLORIDE 10 MG: 5 INJECTION, SOLUTION INTRAVENOUS at 12:52

## 2024-02-02 RX ADMIN — METOPROLOL TARTRATE 5 MG: 1 INJECTION, SOLUTION INTRAVENOUS at 13:42

## 2024-02-02 RX ADMIN — ATORVASTATIN CALCIUM 80 MG: 40 TABLET, FILM COATED ORAL at 18:31

## 2024-02-02 RX ADMIN — PIPERACILLIN AND TAZOBACTAM 4500 MG: 4; .5 INJECTION, POWDER, FOR SOLUTION INTRAVENOUS at 16:53

## 2024-02-02 RX ADMIN — IOPAMIDOL 95 ML: 755 INJECTION, SOLUTION INTRAVENOUS at 15:52

## 2024-02-02 RX ADMIN — APIXABAN 5 MG: 2.5 TABLET, FILM COATED ORAL at 20:57

## 2024-02-02 RX ADMIN — Medication 5 MG/HR: at 13:11

## 2024-02-02 ASSESSMENT — PAIN - FUNCTIONAL ASSESSMENT
PAIN_FUNCTIONAL_ASSESSMENT: NONE - DENIES PAIN
PAIN_FUNCTIONAL_ASSESSMENT: NONE - DENIES PAIN

## 2024-02-02 ASSESSMENT — PAIN SCALES - GENERAL
PAINLEVEL_OUTOF10: 0
PAINLEVEL_OUTOF10: 0

## 2024-02-02 NOTE — H&P
History and Physical    Subjective:     Javi Carbajal is a 73 y.o. male with a past medical history significant for HTN, atrial flutter/fibrillation (on Eliquis), lung cancer, liver cancer, (s/p radiation, currently on On chemo), and chronic hepatitis C, who presents to the ED today with complaints of general malaise, intermittent midsternal chest pain, productive cough with white phlegm, and shortness of breath in the last 2 to 3 weeks. Patient stated he has also had intermittent fevers off-and-on as well, however no vomiting or abdominal pain. He complains of generalized weakness, loss of appetite, and chronic weight loss.  He is actively receiving chemo for lung cancer with suspected mets to the liver, last chemo was January 8, 2024.Per ED provider, when EMS picked him up he was found with HR in the 200s. They gave him a dose of adenosine without improvement.  Upon ED arrival, he remained in SVTs rates in the 170s, s/p multiple doses of adenosine, IV fluids, Cardizem bolus, and cardioverted x 2, with some improvement.  Cardiology was consulted who recommended to start amiodarone drip.  Initial chest x-ray did not show an acute process however his CTA of his chest shows new multilobar airspace disease consistent with pneumonia, among other chronic findings of mediastinal and right hepatic masses with emphysema and apical bullae.       We agreed to inpatient admission criteria for atrial fibrillation/rapid ventricular response, sepsis, 2/2 multilobar pneumonia, in immunocompromised patient.  Estimated LOS: 2-3 midnight.      Past Medical History:   Diagnosis Date    A-fib (HCC)     HTN (hypertension)     Liver cancer (HCC)     Lung cancer (HCC)       History reviewed. No pertinent surgical history.  History reviewed. No pertinent family history.   Social History     Tobacco Use    Smoking status: Every Day     Current packs/day: 0.25     Types: Cigarettes    Smokeless tobacco: Never   Substance Use

## 2024-02-02 NOTE — ED NOTES
Triple Lumen Central line placed in the left subclavian by Dr. Mckeon sterile dressing applied without any complications. Blood samples were drawn and sent to the lab

## 2024-02-02 NOTE — ED NOTES
TRANSFER - OUT REPORT:    Verbal report given to Chelly VALENTINE on Javi A Raven  being transferred to ICU #6 for routine progression of patient care       Report consisted of patient's Situation, Background, Assessment and   Recommendations(SBAR).     Information from the following report(s) Nurse Handoff Report, MAR, Recent Results, and Cardiac Rhythm ST  was reviewed with the receiving nurse.    [place] Fall Assessment:    Presents to emergency department  because of falls (Syncope, seizure, or loss of consciousness): No  Age > 70: No  Altered Mental Status, Intoxication with alcohol or substance confusion (Disorientation, impaired judgment, poor safety awaremess, or inability to follow instructions): No  Impaired Mobility: Ambulates or transfers with assistive devices or assistance; Unable to ambulate or transer.: No  Nursing Judgement: No          Lines:   Peripheral IV 02/02/24 Left;Anterior;Lateral Forearm (Active)       Peripheral IV 02/02/24 Posterior;Right Wrist (Active)   Site Assessment Clean, dry & intact 02/02/24 1236   Line Status Blood return noted 02/02/24 1236        Opportunity for questions and clarification was provided.      Patient transported with:  Monitor, O2 @ 6lpm, and Registered Nurse

## 2024-02-03 PROBLEM — E43 SEVERE PROTEIN-CALORIE MALNUTRITION (HCC): Status: ACTIVE | Noted: 2024-02-03

## 2024-02-03 LAB
ANION GAP SERPL CALC-SCNC: 7 MMOL/L (ref 3–18)
BASOPHILS # BLD: 0 K/UL (ref 0–0.1)
BASOPHILS NFR BLD: 0 % (ref 0–2)
BUN SERPL-MCNC: 13 MG/DL (ref 7–18)
BUN/CREAT SERPL: 17 (ref 12–20)
CA-I BLD-MCNC: 9.9 MG/DL (ref 8.5–10.1)
CHLORIDE SERPL-SCNC: 104 MMOL/L (ref 100–111)
CO2 SERPL-SCNC: 27 MMOL/L (ref 21–32)
CREAT SERPL-MCNC: 0.75 MG/DL (ref 0.6–1.3)
DIFFERENTIAL METHOD BLD: ABNORMAL
EOSINOPHIL # BLD: 0 K/UL (ref 0–0.4)
EOSINOPHIL NFR BLD: 0 % (ref 0–5)
ERYTHROCYTE [DISTWIDTH] IN BLOOD BY AUTOMATED COUNT: 18.5 % (ref 11.6–14.5)
GLUCOSE SERPL-MCNC: 106 MG/DL (ref 74–99)
HCT VFR BLD AUTO: 31.3 % (ref 36–48)
HGB BLD-MCNC: 10.2 G/DL (ref 13–16)
IMM GRANULOCYTES # BLD AUTO: 0.1 K/UL (ref 0–0.04)
IMM GRANULOCYTES NFR BLD AUTO: 1 % (ref 0–0.5)
LACTATE SERPL-SCNC: 1 MMOL/L (ref 0.4–2)
LYMPHOCYTES # BLD: 0.6 K/UL (ref 0.9–3.6)
LYMPHOCYTES NFR BLD: 4 % (ref 21–52)
MAGNESIUM SERPL-MCNC: 1.7 MG/DL (ref 1.6–2.6)
MCH RBC QN AUTO: 27.7 PG (ref 24–34)
MCHC RBC AUTO-ENTMCNC: 32.6 G/DL (ref 31–37)
MCV RBC AUTO: 85.1 FL (ref 78–100)
MONOCYTES # BLD: 1 K/UL (ref 0.05–1.2)
MONOCYTES NFR BLD: 7 % (ref 3–10)
NEUTS SEG # BLD: 12.8 K/UL (ref 1.8–8)
NEUTS SEG NFR BLD: 88 % (ref 40–73)
NRBC # BLD: 0 K/UL (ref 0–0.01)
NRBC BLD-RTO: 0 PER 100 WBC
PLATELET # BLD AUTO: 341 K/UL (ref 135–420)
PMV BLD AUTO: 8.7 FL (ref 9.2–11.8)
POTASSIUM SERPL-SCNC: 3.3 MMOL/L (ref 3.5–5.5)
PROLACTIN SERPL-MCNC: 6.5 NG/ML
RBC # BLD AUTO: 3.68 M/UL (ref 4.35–5.65)
SODIUM SERPL-SCNC: 138 MMOL/L (ref 136–145)
WBC # BLD AUTO: 14.4 K/UL (ref 4.6–13.2)

## 2024-02-03 PROCEDURE — 2500000003 HC RX 250 WO HCPCS: Performed by: NURSE PRACTITIONER

## 2024-02-03 PROCEDURE — 36415 COLL VENOUS BLD VENIPUNCTURE: CPT

## 2024-02-03 PROCEDURE — 80048 BASIC METABOLIC PNL TOTAL CA: CPT

## 2024-02-03 PROCEDURE — 6360000002 HC RX W HCPCS: Performed by: NURSE PRACTITIONER

## 2024-02-03 PROCEDURE — 6360000002 HC RX W HCPCS: Performed by: INTERNAL MEDICINE

## 2024-02-03 PROCEDURE — 2000000000 HC ICU R&B

## 2024-02-03 PROCEDURE — 83605 ASSAY OF LACTIC ACID: CPT

## 2024-02-03 PROCEDURE — 6370000000 HC RX 637 (ALT 250 FOR IP): Performed by: INTERNAL MEDICINE

## 2024-02-03 PROCEDURE — 2580000003 HC RX 258: Performed by: NURSE PRACTITIONER

## 2024-02-03 PROCEDURE — 83735 ASSAY OF MAGNESIUM: CPT

## 2024-02-03 PROCEDURE — 2700000000 HC OXYGEN THERAPY PER DAY

## 2024-02-03 PROCEDURE — 94761 N-INVAS EAR/PLS OXIMETRY MLT: CPT

## 2024-02-03 PROCEDURE — 85025 COMPLETE CBC W/AUTO DIFF WBC: CPT

## 2024-02-03 PROCEDURE — 6370000000 HC RX 637 (ALT 250 FOR IP): Performed by: NURSE PRACTITIONER

## 2024-02-03 RX ORDER — POTASSIUM CHLORIDE 750 MG/1
40 TABLET, EXTENDED RELEASE ORAL ONCE
Status: COMPLETED | OUTPATIENT
Start: 2024-02-03 | End: 2024-02-03

## 2024-02-03 RX ORDER — DEXTROSE MONOHYDRATE 50 MG/ML
INJECTION, SOLUTION INTRAVENOUS
Status: DISPENSED
Start: 2024-02-03 | End: 2024-02-03

## 2024-02-03 RX ORDER — METOPROLOL TARTRATE 1 MG/ML
5 INJECTION, SOLUTION INTRAVENOUS ONCE
Status: COMPLETED | OUTPATIENT
Start: 2024-02-03 | End: 2024-02-03

## 2024-02-03 RX ORDER — MULTIVITAMIN WITH IRON
1 TABLET ORAL DAILY
Status: DISCONTINUED | OUTPATIENT
Start: 2024-02-03 | End: 2024-02-07 | Stop reason: HOSPADM

## 2024-02-03 RX ORDER — POTASSIUM CHLORIDE 7.45 MG/ML
10 INJECTION INTRAVENOUS
Status: COMPLETED | OUTPATIENT
Start: 2024-02-03 | End: 2024-02-03

## 2024-02-03 RX ORDER — AMIODARONE HYDROCHLORIDE 450 MG/9ML
INJECTION, SOLUTION INTRAVENOUS
Status: DISPENSED
Start: 2024-02-03 | End: 2024-02-03

## 2024-02-03 RX ORDER — PREDNISONE 20 MG/1
40 TABLET ORAL DAILY
Status: DISCONTINUED | OUTPATIENT
Start: 2024-02-03 | End: 2024-02-05

## 2024-02-03 RX ADMIN — METOPROLOL TARTRATE 5 MG: 5 INJECTION INTRAVENOUS at 15:38

## 2024-02-03 RX ADMIN — PIPERACILLIN AND TAZOBACTAM 3375 MG: 3; .375 INJECTION, POWDER, FOR SOLUTION INTRAVENOUS at 11:22

## 2024-02-03 RX ADMIN — POTASSIUM CHLORIDE 40 MEQ: 750 TABLET, EXTENDED RELEASE ORAL at 08:20

## 2024-02-03 RX ADMIN — POTASSIUM CHLORIDE 10 MEQ: 7.46 INJECTION, SOLUTION INTRAVENOUS at 10:10

## 2024-02-03 RX ADMIN — APIXABAN 5 MG: 2.5 TABLET, FILM COATED ORAL at 08:20

## 2024-02-03 RX ADMIN — POTASSIUM CHLORIDE 10 MEQ: 7.46 INJECTION, SOLUTION INTRAVENOUS at 11:21

## 2024-02-03 RX ADMIN — METOPROLOL TARTRATE 5 MG: 5 INJECTION INTRAVENOUS at 13:38

## 2024-02-03 RX ADMIN — POTASSIUM CHLORIDE AND SODIUM CHLORIDE: 900; 150 INJECTION, SOLUTION INTRAVENOUS at 16:48

## 2024-02-03 RX ADMIN — VANCOMYCIN HYDROCHLORIDE 750 MG: 750 INJECTION, POWDER, LYOPHILIZED, FOR SOLUTION INTRAVENOUS at 05:31

## 2024-02-03 RX ADMIN — AMIODARONE HYDROCHLORIDE 0.5 MG/MIN: 50 INJECTION, SOLUTION INTRAVENOUS at 15:36

## 2024-02-03 RX ADMIN — APIXABAN 5 MG: 2.5 TABLET, FILM COATED ORAL at 21:05

## 2024-02-03 RX ADMIN — POTASSIUM CHLORIDE 10 MEQ: 7.46 INJECTION, SOLUTION INTRAVENOUS at 09:14

## 2024-02-03 RX ADMIN — VANCOMYCIN HYDROCHLORIDE 750 MG: 750 INJECTION, POWDER, LYOPHILIZED, FOR SOLUTION INTRAVENOUS at 17:36

## 2024-02-03 RX ADMIN — POTASSIUM CHLORIDE 10 MEQ: 7.46 INJECTION, SOLUTION INTRAVENOUS at 08:15

## 2024-02-03 RX ADMIN — PIPERACILLIN AND TAZOBACTAM 3375 MG: 3; .375 INJECTION, POWDER, FOR SOLUTION INTRAVENOUS at 17:39

## 2024-02-03 RX ADMIN — ATORVASTATIN CALCIUM 80 MG: 40 TABLET, FILM COATED ORAL at 08:20

## 2024-02-03 RX ADMIN — MIRTAZAPINE 30 MG: 15 TABLET, FILM COATED ORAL at 21:05

## 2024-02-03 RX ADMIN — THERA TABS 1 TABLET: TAB at 15:06

## 2024-02-03 RX ADMIN — METOPROLOL TARTRATE 25 MG: 25 TABLET, FILM COATED ORAL at 08:20

## 2024-02-03 RX ADMIN — PIPERACILLIN AND TAZOBACTAM 3375 MG: 3; .375 INJECTION, POWDER, FOR SOLUTION INTRAVENOUS at 22:59

## 2024-02-03 RX ADMIN — POTASSIUM CHLORIDE AND SODIUM CHLORIDE: 900; 150 INJECTION, SOLUTION INTRAVENOUS at 05:37

## 2024-02-03 RX ADMIN — PIPERACILLIN AND TAZOBACTAM 3375 MG: 3; .375 INJECTION, POWDER, FOR SOLUTION INTRAVENOUS at 04:59

## 2024-02-03 RX ADMIN — METOPROLOL TARTRATE 25 MG: 25 TABLET, FILM COATED ORAL at 21:05

## 2024-02-03 RX ADMIN — AMIODARONE HYDROCHLORIDE 0.5 MG/MIN: 50 INJECTION, SOLUTION INTRAVENOUS at 03:33

## 2024-02-03 RX ADMIN — PREDNISONE 40 MG: 20 TABLET ORAL at 08:20

## 2024-02-03 NOTE — ACP (ADVANCE CARE PLANNING)
Advance Care Planning     General Advance Care Planning (ACP) Conversation    Date of Conversation: 2/2/2024  Conducted with: Patient with Decision Making Capacity    Healthcare Decision Maker:  No healthcare decision makers have been documented.  Click here to complete HealthCare Decision Makers including selection of the Healthcare Decision Maker Relationship (ie \"Primary\")      Content/Action Overview:  Has ACP document(s) on file - reflects the patient's care preferences  Reviewed DNR/DNI and patient elects Full Code (Attempt Resuscitation)        Length of Voluntary ACP Conversation in minutes:  <16 minutes (Non-Billable)    Polina Slater

## 2024-02-03 NOTE — PLAN OF CARE
Problem: Discharge Planning  Goal: Discharge to home or other facility with appropriate resources  [facility]     Problem: ABCDS Injury Assessment  Goal: Absence of physical injury  Outcome: /[facility] [place] Progressing  Flowsheets (Taken 2/2/2024 1822 by Mary Liao, RN)  Absence of Physical Injury: Implement safety measures based on patient assessment     Problem: Safety - Adult  Goal: Free from fall injury  Outcome: /[facility] [place] Progressing  Flowsheets (Taken 2/2/2024 1822 by Mary Liao, RN)  Free From Fall Injury:   Instruct family/caregiver on patient safety   Based on caregiver fall risk screen, instruct family/caregiver to ask for assistance with transferring infant if caregiver noted to have fall risk factors

## 2024-02-03 NOTE — CARE COORDINATION
Case Management Assessment  Initial Evaluation    Date/Time of Evaluation: 2/3/2024 1:32 PM  Assessment Completed by: Polina Slater    If patient is discharged prior to next notation, then this note serves as note for discharge by case management.    Patient Name: Javi Carbajal                   YOB: 1950  Diagnosis: Paroxysmal supraventricular tachycardia [I47.10]  Hypokalemia [E87.6]  Pneumonia due to organism [J18.9]  Septicemia (HCC) [A41.9]  History of liver cancer [Z85.05]  History of lung cancer [Z85.118]  Pneumonia due to infectious organism, unspecified laterality, unspecified part of lung [J18.9]                   Date / Time: 2/2/2024 12:30 PM    Patient Admission Status: Inpatient   Readmission Risk (Low < 19, Mod (19-27), High > 27): Readmission Risk Score: 14    Current PCP: Torres Nunez MD  PCP verified by CM?      Chart Reviewed: Yes      History Provided by:    Patient Orientation:      Patient Cognition:      Hospitalization in the last 30 days (Readmission):  No    If yes, Readmission Assessment in CM Navigator will be completed.    Advance Directives:      Code Status: Full Code   Patient's Primary Decision Maker is:        Discharge Planning:    Patient lives with: Spouse/Significant Other Type of Home: House  Primary Care Giver:    Patient Support Systems include: Spouse/Significant Other   Current Financial resources:    Current community resources:    Current services prior to admission: Durable Medical Equipment            Current DME: Cane            Type of Home Care services:  None    ADLS  Prior functional level:    Current functional level:      PT AM-PAC:   /24  OT AM-PAC:   /24    Family can provide assistance at DC:    Would you like Case Management to discuss the discharge plan with any other family members/significant others, and if so, who?    Plans to Return to Present Housing:    Other Identified Issues/Barriers to RETURNING to current housing:

## 2024-02-03 NOTE — PLAN OF CARE
Comprehensive Nutrition Assessment    Type and Reason for Visit:  Initial    Nutrition Recommendations/Plan:   Liberalized diet to Regular  Order Ensure Enlive TID (provides 350kcal, 16g pro each)  Ordered daily Multivitamin  Patient meets >2/6 ASPEN criteria, indicating severe protein-calorie malnutrition, diagnosis placed     Malnutrition Assessment:  Malnutrition Status:  Severe malnutrition (02/03/24 1402)    Context:  Chronic Illness     Findings of the 6 clinical characteristics of malnutrition:  Energy Intake:  Mild decrease in energy intake (Comment) (Sig decrease in intake x1 week)  Weight Loss:  Greater than 5% over 1 month     Body Fat Loss:  Severe body fat loss Orbital, Triceps, Fat Overlying Ribs, Buccal region   Muscle Mass Loss:  Severe muscle mass loss Temples (temporalis), Clavicles (pectoralis & deltoids), Thigh (quadraceps), Calf (gastrocnemius), Hand (interosseous), Scapula (trapezius)  Fluid Accumulation:  No significant fluid accumulation     Strength:  Not Performed    Nutrition Assessment:    Mr Carbajal is a 72 yo male with pmhx sig for HTN, atrial flutter/fibrillation (on Eliquis), lung cancer, liver cancer, (s/p radiation, currently on On chemo), and chronic hepatitis C, who presents with complaints of general malaise, intermittent midsternal chest pain, productive cough with white phlegm, and shortness of breath in the last 2 to 3 weeks.  Appetite and intake is poor, has been served meals while in ICU, but refused all.  Currently, with sig weight loss over the past month >5%, per ASPEN criteria, patient meets >2/6 to pass for severe protein-calorie malnutrition.  Reports he consumes Ensure Plus TID at home.  This provider brought Ensure Enlive and Ensure Compact to the patient, and encouraged intake.  Will liberalize diet order 2/2 poor intake.    Nutrition Related Findings:      Wound Type: None       Current Nutrition Intake & Therapies:    Average Meal Intake: 0%  Average

## 2024-02-04 LAB
ANION GAP SERPL CALC-SCNC: 7 MMOL/L (ref 3–18)
BASOPHILS # BLD: 0.1 K/UL (ref 0–0.1)
BASOPHILS NFR BLD: 1 % (ref 0–2)
BUN SERPL-MCNC: 10 MG/DL (ref 7–18)
BUN/CREAT SERPL: 14 (ref 12–20)
CA-I BLD-MCNC: 10.2 MG/DL (ref 8.5–10.1)
CHLORIDE SERPL-SCNC: 107 MMOL/L (ref 100–111)
CO2 SERPL-SCNC: 24 MMOL/L (ref 21–32)
CREAT SERPL-MCNC: 0.69 MG/DL (ref 0.6–1.3)
DATE LAST DOSE: ABNORMAL
DIFFERENTIAL METHOD BLD: ABNORMAL
DOSE AMOUNT: ABNORMAL UNITS
EKG ATRIAL RATE: 120 BPM
EKG DIAGNOSIS: NORMAL
EKG P AXIS: 52 DEGREES
EKG P-R INTERVAL: 144 MS
EKG Q-T INTERVAL: 322 MS
EKG QRS DURATION: 80 MS
EKG QTC CALCULATION (BAZETT): 455 MS
EKG R AXIS: 76 DEGREES
EKG T AXIS: 36 DEGREES
EKG VENTRICULAR RATE: 120 BPM
EOSINOPHIL # BLD: 0 K/UL (ref 0–0.4)
EOSINOPHIL NFR BLD: 0 % (ref 0–5)
ERYTHROCYTE [DISTWIDTH] IN BLOOD BY AUTOMATED COUNT: 18.4 % (ref 11.6–14.5)
GLUCOSE SERPL-MCNC: 89 MG/DL (ref 74–99)
HCT VFR BLD AUTO: 33.9 % (ref 36–48)
HGB BLD-MCNC: 10.9 G/DL (ref 13–16)
IMM GRANULOCYTES # BLD AUTO: 0.1 K/UL (ref 0–0.04)
IMM GRANULOCYTES NFR BLD AUTO: 1 % (ref 0–0.5)
LYMPHOCYTES # BLD: 0.4 K/UL (ref 0.9–3.6)
LYMPHOCYTES NFR BLD: 3 % (ref 21–52)
MAGNESIUM SERPL-MCNC: 1.7 MG/DL (ref 1.6–2.6)
MCH RBC QN AUTO: 27.5 PG (ref 24–34)
MCHC RBC AUTO-ENTMCNC: 32.2 G/DL (ref 31–37)
MCV RBC AUTO: 85.6 FL (ref 78–100)
MONOCYTES # BLD: 0.8 K/UL (ref 0.05–1.2)
MONOCYTES NFR BLD: 6 % (ref 3–10)
NEUTS SEG # BLD: 11.7 K/UL (ref 1.8–8)
NEUTS SEG NFR BLD: 89 % (ref 40–73)
NRBC # BLD: 0 K/UL (ref 0–0.01)
NRBC BLD-RTO: 0 PER 100 WBC
PHOSPHATE SERPL-MCNC: 2.2 MG/DL (ref 2.5–4.9)
PLATELET # BLD AUTO: 387 K/UL (ref 135–420)
PMV BLD AUTO: 8.8 FL (ref 9.2–11.8)
POTASSIUM SERPL-SCNC: 3.8 MMOL/L (ref 3.5–5.5)
RBC # BLD AUTO: 3.96 M/UL (ref 4.35–5.65)
SODIUM SERPL-SCNC: 138 MMOL/L (ref 136–145)
VANCOMYCIN TROUGH SERPL-MCNC: 6.5 UG/ML (ref 10–20)
WBC # BLD AUTO: 13.1 K/UL (ref 4.6–13.2)

## 2024-02-04 PROCEDURE — 84100 ASSAY OF PHOSPHORUS: CPT

## 2024-02-04 PROCEDURE — 2580000003 HC RX 258: Performed by: NURSE PRACTITIONER

## 2024-02-04 PROCEDURE — 6360000002 HC RX W HCPCS: Performed by: NURSE PRACTITIONER

## 2024-02-04 PROCEDURE — 83735 ASSAY OF MAGNESIUM: CPT

## 2024-02-04 PROCEDURE — 80048 BASIC METABOLIC PNL TOTAL CA: CPT

## 2024-02-04 PROCEDURE — 80202 ASSAY OF VANCOMYCIN: CPT

## 2024-02-04 PROCEDURE — 2700000000 HC OXYGEN THERAPY PER DAY

## 2024-02-04 PROCEDURE — 93005 ELECTROCARDIOGRAM TRACING: CPT | Performed by: NURSE PRACTITIONER

## 2024-02-04 PROCEDURE — 97162 PT EVAL MOD COMPLEX 30 MIN: CPT

## 2024-02-04 PROCEDURE — 2500000003 HC RX 250 WO HCPCS: Performed by: NURSE PRACTITIONER

## 2024-02-04 PROCEDURE — 6370000000 HC RX 637 (ALT 250 FOR IP): Performed by: NURSE PRACTITIONER

## 2024-02-04 PROCEDURE — 6370000000 HC RX 637 (ALT 250 FOR IP): Performed by: INTERNAL MEDICINE

## 2024-02-04 PROCEDURE — 6360000002 HC RX W HCPCS: Performed by: INTERNAL MEDICINE

## 2024-02-04 PROCEDURE — 2580000003 HC RX 258: Performed by: INTERNAL MEDICINE

## 2024-02-04 PROCEDURE — 2000000000 HC ICU R&B

## 2024-02-04 PROCEDURE — 85025 COMPLETE CBC W/AUTO DIFF WBC: CPT

## 2024-02-04 PROCEDURE — 94761 N-INVAS EAR/PLS OXIMETRY MLT: CPT

## 2024-02-04 RX ORDER — METOPROLOL TARTRATE 1 MG/ML
5 INJECTION, SOLUTION INTRAVENOUS ONCE
Status: COMPLETED | OUTPATIENT
Start: 2024-02-04 | End: 2024-02-04

## 2024-02-04 RX ORDER — DILTIAZEM HYDROCHLORIDE 5 MG/ML
10 INJECTION INTRAVENOUS ONCE
Status: COMPLETED | OUTPATIENT
Start: 2024-02-04 | End: 2024-02-04

## 2024-02-04 RX ORDER — MAGNESIUM SULFATE IN WATER 40 MG/ML
2000 INJECTION, SOLUTION INTRAVENOUS
Status: COMPLETED | OUTPATIENT
Start: 2024-02-04 | End: 2024-02-04

## 2024-02-04 RX ORDER — POTASSIUM CHLORIDE 750 MG/1
40 TABLET, EXTENDED RELEASE ORAL ONCE
Status: COMPLETED | OUTPATIENT
Start: 2024-02-04 | End: 2024-02-04

## 2024-02-04 RX ORDER — METOPROLOL TARTRATE 50 MG/1
50 TABLET, FILM COATED ORAL 2 TIMES DAILY
Status: DISCONTINUED | OUTPATIENT
Start: 2024-02-04 | End: 2024-02-05

## 2024-02-04 RX ORDER — 0.9 % SODIUM CHLORIDE 0.9 %
500 INTRAVENOUS SOLUTION INTRAVENOUS ONCE
Status: COMPLETED | OUTPATIENT
Start: 2024-02-04 | End: 2024-02-04

## 2024-02-04 RX ADMIN — MAGNESIUM SULFATE HEPTAHYDRATE 2000 MG: 40 INJECTION, SOLUTION INTRAVENOUS at 08:44

## 2024-02-04 RX ADMIN — PIPERACILLIN AND TAZOBACTAM 3375 MG: 3; .375 INJECTION, POWDER, FOR SOLUTION INTRAVENOUS at 04:56

## 2024-02-04 RX ADMIN — AMIODARONE HYDROCHLORIDE 0.5 MG/MIN: 50 INJECTION, SOLUTION INTRAVENOUS at 07:20

## 2024-02-04 RX ADMIN — VANCOMYCIN HYDROCHLORIDE 1250 MG: 1.25 INJECTION, POWDER, LYOPHILIZED, FOR SOLUTION INTRAVENOUS at 17:14

## 2024-02-04 RX ADMIN — METOPROLOL TARTRATE 25 MG: 25 TABLET, FILM COATED ORAL at 08:47

## 2024-02-04 RX ADMIN — PIPERACILLIN AND TAZOBACTAM 3375 MG: 3; .375 INJECTION, POWDER, FOR SOLUTION INTRAVENOUS at 17:12

## 2024-02-04 RX ADMIN — PREDNISONE 40 MG: 20 TABLET ORAL at 08:47

## 2024-02-04 RX ADMIN — ATORVASTATIN CALCIUM 80 MG: 40 TABLET, FILM COATED ORAL at 08:46

## 2024-02-04 RX ADMIN — THERA TABS 1 TABLET: TAB at 08:46

## 2024-02-04 RX ADMIN — POTASSIUM CHLORIDE 40 MEQ: 750 TABLET, EXTENDED RELEASE ORAL at 08:46

## 2024-02-04 RX ADMIN — METOPROLOL TARTRATE 5 MG: 5 INJECTION INTRAVENOUS at 14:58

## 2024-02-04 RX ADMIN — POTASSIUM CHLORIDE AND SODIUM CHLORIDE: 900; 150 INJECTION, SOLUTION INTRAVENOUS at 23:06

## 2024-02-04 RX ADMIN — DILTIAZEM HYDROCHLORIDE 10 MG: 5 INJECTION INTRAVENOUS at 17:59

## 2024-02-04 RX ADMIN — VANCOMYCIN HYDROCHLORIDE 750 MG: 750 INJECTION, POWDER, LYOPHILIZED, FOR SOLUTION INTRAVENOUS at 05:35

## 2024-02-04 RX ADMIN — METOPROLOL TARTRATE 50 MG: 50 TABLET ORAL at 20:52

## 2024-02-04 RX ADMIN — DEXTROSE MONOHYDRATE 0.5 MG/MIN: 50 INJECTION, SOLUTION INTRAVENOUS at 23:17

## 2024-02-04 RX ADMIN — POTASSIUM CHLORIDE AND SODIUM CHLORIDE: 900; 150 INJECTION, SOLUTION INTRAVENOUS at 12:27

## 2024-02-04 RX ADMIN — PIPERACILLIN AND TAZOBACTAM 3375 MG: 3; .375 INJECTION, POWDER, FOR SOLUTION INTRAVENOUS at 11:12

## 2024-02-04 RX ADMIN — MIRTAZAPINE 30 MG: 15 TABLET, FILM COATED ORAL at 20:52

## 2024-02-04 RX ADMIN — SODIUM CHLORIDE 500 ML: 9 INJECTION, SOLUTION INTRAVENOUS at 17:56

## 2024-02-04 RX ADMIN — APIXABAN 5 MG: 2.5 TABLET, FILM COATED ORAL at 08:47

## 2024-02-04 RX ADMIN — POTASSIUM CHLORIDE AND SODIUM CHLORIDE: 900; 150 INJECTION, SOLUTION INTRAVENOUS at 02:24

## 2024-02-04 RX ADMIN — PIPERACILLIN AND TAZOBACTAM 3375 MG: 3; .375 INJECTION, POWDER, FOR SOLUTION INTRAVENOUS at 23:04

## 2024-02-04 RX ADMIN — APIXABAN 5 MG: 2.5 TABLET, FILM COATED ORAL at 20:51

## 2024-02-04 ASSESSMENT — PAIN SCALES - GENERAL
PAINLEVEL_OUTOF10: 0
PAINLEVEL_OUTOF10: 0

## 2024-02-04 NOTE — THERAPY EVALUATION
PHYSICAL THERAPY EVALUATION    Patient: Javi Carbajal (73 y.o. male)  Date: 2/4/2024  Physical Therapy Time:   PT Individual Minutes  Time In: 1015  Time Out: 1032  Minutes: 17  Timed Minute Breakdown:  eval     Primary Diagnosis: Paroxysmal supraventricular tachycardia [I47.10]  Hypokalemia [E87.6]  Pneumonia due to organism [J18.9]  Septicemia (HCC) [A41.9]  History of liver cancer [Z85.05]  History of lung cancer [Z85.118]  Pneumonia due to infectious organism, unspecified laterality, unspecified part of lung [J18.9] Pneumonia due to organism  Present on Admission:   Pneumonia due to organism   Severe protein-calorie malnutrition (HCC)        Precautions:   Restrictions/Precautions  Restrictions/Precautions: Other (comment) (monitor SpO2 and HR)      Assessment: Pt admitted for sepsis with SVT and Afib with RVR. He was on R.A. initially with SpO2 at 88%, nursing states he did well overnight but therapist could place him back on 2L. He performs mobility well with SpO2 dropping to 83% with increased time needed to return to 90%. He is left with all needs met.  Performance Deficits/Impairments: Decreased functional mobility ;Decreased ADL status;Decreased strength;Decreased endurance  Treatment Diagnosis: difficulty in walking  Therapy Prognosis: Good  Decision Making: Medium Complexity  Discharge Recommendations: Outpatient PT    Conditions Requiring skilled therapeutic intervention:  Performance Deficits/Impairments: Decreased functional mobility ;Decreased ADL status;Decreased strength;Decreased endurance     Evaluation Activity Tolerance:   Activity Tolerance  Activity Tolerance: Treatment limited secondary to medical complications    Equipment Recommendations for Discharge:  PT Equipment Recommendations  Equipment Needed: No    AM-PAC  AM-PAC Inpatient Mobility Raw Score : 22; Current research shows that an AM-PAC score of 17 or less is typically not associated with a discharge to the patient's home

## 2024-02-05 ENCOUNTER — APPOINTMENT (OUTPATIENT)
Age: 74
DRG: 871 | End: 2024-02-05
Payer: MEDICARE

## 2024-02-05 LAB
ANION GAP SERPL CALC-SCNC: 8 MMOL/L (ref 3–18)
BASOPHILS # BLD: 0 K/UL (ref 0–0.1)
BASOPHILS NFR BLD: 0 % (ref 0–2)
BNP SERPL-MCNC: 600 PG/ML (ref 0–900)
BUN SERPL-MCNC: 10 MG/DL (ref 7–18)
BUN/CREAT SERPL: 14 (ref 12–20)
CA-I BLD-MCNC: 10.2 MG/DL (ref 8.5–10.1)
CHLORIDE SERPL-SCNC: 105 MMOL/L (ref 100–111)
CO2 SERPL-SCNC: 27 MMOL/L (ref 21–32)
CREAT SERPL-MCNC: 0.7 MG/DL (ref 0.6–1.3)
DIFFERENTIAL METHOD BLD: ABNORMAL
ECHO AO ASC DIAM: 3.9 CM
ECHO AO ASCENDING AORTA INDEX: 2.13 CM/M2
ECHO AO ROOT DIAM: 3.6 CM
ECHO AO ROOT INDEX: 1.97 CM/M2
ECHO AV AREA PEAK VELOCITY: 3.5 CM2
ECHO AV AREA VTI: 3.3 CM2
ECHO AV AREA/BSA PEAK VELOCITY: 1.9 CM2/M2
ECHO AV AREA/BSA VTI: 1.8 CM2/M2
ECHO AV MEAN GRADIENT: 2 MMHG
ECHO AV MEAN VELOCITY: 0.6 M/S
ECHO AV PEAK GRADIENT: 3 MMHG
ECHO AV PEAK VELOCITY: 0.9 M/S
ECHO AV VELOCITY RATIO: 0.89
ECHO AV VTI: 15.4 CM
ECHO BSA: 1.77 M2
ECHO EST RA PRESSURE: 3 MMHG
ECHO IVC PROX: 1.5 CM
ECHO LA AREA 2C: 17.3 CM2
ECHO LA AREA 4C: 13.9 CM2
ECHO LA DIAMETER INDEX: 1.53 CM/M2
ECHO LA DIAMETER: 2.8 CM
ECHO LA MAJOR AXIS: 5 CM
ECHO LA MINOR AXIS: 5.5 CM
ECHO LA TO AORTIC ROOT RATIO: 0.78
ECHO LA VOL BP: 39 ML (ref 18–58)
ECHO LA VOL MOD A2C: 44 ML (ref 18–58)
ECHO LA VOL MOD A4C: 31 ML (ref 18–58)
ECHO LA VOL/BSA BIPLANE: 21 ML/M2 (ref 16–34)
ECHO LA VOLUME INDEX MOD A2C: 24 ML/M2 (ref 16–34)
ECHO LA VOLUME INDEX MOD A4C: 17 ML/M2 (ref 16–34)
ECHO LV E' LATERAL VELOCITY: 7 CM/S
ECHO LV E' SEPTAL VELOCITY: 9 CM/S
ECHO LV EDV A2C: 40 ML
ECHO LV EDV A4C: 40 ML
ECHO LV EDV INDEX A4C: 22 ML/M2
ECHO LV EDV NDEX A2C: 22 ML/M2
ECHO LV EJECTION FRACTION A2C: 56 %
ECHO LV EJECTION FRACTION A4C: 58 %
ECHO LV EJECTION FRACTION BIPLANE: 58 % (ref 55–100)
ECHO LV ESV A2C: 18 ML
ECHO LV ESV A4C: 17 ML
ECHO LV ESV INDEX A2C: 10 ML/M2
ECHO LV ESV INDEX A4C: 9 ML/M2
ECHO LV FRACTIONAL SHORTENING: 30 % (ref 28–44)
ECHO LV INTERNAL DIMENSION DIASTOLE INDEX: 2.19 CM/M2
ECHO LV INTERNAL DIMENSION DIASTOLIC: 4 CM (ref 4.2–5.9)
ECHO LV INTERNAL DIMENSION SYSTOLIC INDEX: 1.53 CM/M2
ECHO LV INTERNAL DIMENSION SYSTOLIC: 2.8 CM
ECHO LV IVSD: 1.1 CM (ref 0.6–1)
ECHO LV MASS 2D: 155.4 G (ref 88–224)
ECHO LV MASS INDEX 2D: 84.9 G/M2 (ref 49–115)
ECHO LV POSTERIOR WALL DIASTOLIC: 1.2 CM (ref 0.6–1)
ECHO LV RELATIVE WALL THICKNESS RATIO: 0.6
ECHO LVOT AREA: 3.8 CM2
ECHO LVOT AV VTI INDEX: 0.87
ECHO LVOT DIAM: 2.2 CM
ECHO LVOT MEAN GRADIENT: 1 MMHG
ECHO LVOT PEAK GRADIENT: 2 MMHG
ECHO LVOT PEAK VELOCITY: 0.8 M/S
ECHO LVOT STROKE VOLUME INDEX: 27.8 ML/M2
ECHO LVOT SV: 50.9 ML
ECHO LVOT VTI: 13.4 CM
ECHO MV A VELOCITY: 0.66 M/S
ECHO MV AREA VTI: 4.3 CM2
ECHO MV E DECELERATION TIME (DT): 242 MS
ECHO MV E VELOCITY: 0.51 M/S
ECHO MV E/A RATIO: 0.77
ECHO MV E/E' LATERAL: 7.29
ECHO MV E/E' RATIO (AVERAGED): 6.48
ECHO MV LVOT VTI INDEX: 0.88
ECHO MV MAX VELOCITY: 0.8 M/S
ECHO MV MEAN GRADIENT: 1 MMHG
ECHO MV MEAN VELOCITY: 0.4 M/S
ECHO MV PEAK GRADIENT: 2 MMHG
ECHO MV VTI: 11.8 CM
ECHO PV MAX VELOCITY: 0.5 M/S
ECHO PV PEAK GRADIENT: 1 MMHG
ECHO RA AREA 4C: 16.3 CM2
ECHO RA END SYSTOLIC VOLUME APICAL 4 CHAMBER INDEX BSA: 26 ML/M2
ECHO RA VOLUME: 48 ML
ECHO RIGHT VENTRICULAR SYSTOLIC PRESSURE (RVSP): 19 MMHG
ECHO RV BASAL DIMENSION: 3.5 CM
ECHO RV LONGITUDINAL DIMENSION: 5.3 CM
ECHO RV MID DIMENSION: 2.2 CM
ECHO RV TAPSE: 1.9 CM (ref 1.7–?)
ECHO TV REGURGITANT MAX VELOCITY: 2.02 M/S
ECHO TV REGURGITANT PEAK GRADIENT: 16 MMHG
EKG ATRIAL RATE: 122 BPM
EKG DIAGNOSIS: NORMAL
EKG P AXIS: 75 DEGREES
EKG P-R INTERVAL: 156 MS
EKG Q-T INTERVAL: 270 MS
EKG QRS DURATION: 74 MS
EKG QTC CALCULATION (BAZETT): 384 MS
EKG R AXIS: 75 DEGREES
EKG T AXIS: 52 DEGREES
EKG VENTRICULAR RATE: 122 BPM
EOSINOPHIL # BLD: 0 K/UL (ref 0–0.4)
EOSINOPHIL NFR BLD: 0 % (ref 0–5)
ERYTHROCYTE [DISTWIDTH] IN BLOOD BY AUTOMATED COUNT: 18.6 % (ref 11.6–14.5)
GLUCOSE SERPL-MCNC: 96 MG/DL (ref 74–99)
HCT VFR BLD AUTO: 33.6 % (ref 36–48)
HGB BLD-MCNC: 10.8 G/DL (ref 13–16)
IMM GRANULOCYTES # BLD AUTO: 0.1 K/UL (ref 0–0.04)
IMM GRANULOCYTES NFR BLD AUTO: 1 % (ref 0–0.5)
LYMPHOCYTES # BLD: 0.5 K/UL (ref 0.9–3.6)
LYMPHOCYTES NFR BLD: 4 % (ref 21–52)
MAGNESIUM SERPL-MCNC: 1.9 MG/DL (ref 1.6–2.6)
MCH RBC QN AUTO: 27.1 PG (ref 24–34)
MCHC RBC AUTO-ENTMCNC: 32.1 G/DL (ref 31–37)
MCV RBC AUTO: 84.4 FL (ref 78–100)
MONOCYTES # BLD: 0.9 K/UL (ref 0.05–1.2)
MONOCYTES NFR BLD: 7 % (ref 3–10)
NEUTS SEG # BLD: 11.2 K/UL (ref 1.8–8)
NEUTS SEG NFR BLD: 88 % (ref 40–73)
NRBC # BLD: 0 K/UL (ref 0–0.01)
NRBC BLD-RTO: 0 PER 100 WBC
PHOSPHATE SERPL-MCNC: 2 MG/DL (ref 2.5–4.9)
PLATELET # BLD AUTO: 421 K/UL (ref 135–420)
PMV BLD AUTO: 8.6 FL (ref 9.2–11.8)
POTASSIUM SERPL-SCNC: 3.9 MMOL/L (ref 3.5–5.5)
RBC # BLD AUTO: 3.98 M/UL (ref 4.35–5.65)
SODIUM SERPL-SCNC: 140 MMOL/L (ref 136–145)
WBC # BLD AUTO: 12.6 K/UL (ref 4.6–13.2)

## 2024-02-05 PROCEDURE — 6370000000 HC RX 637 (ALT 250 FOR IP): Performed by: NURSE PRACTITIONER

## 2024-02-05 PROCEDURE — 2000000000 HC ICU R&B

## 2024-02-05 PROCEDURE — 83880 ASSAY OF NATRIURETIC PEPTIDE: CPT

## 2024-02-05 PROCEDURE — 6370000000 HC RX 637 (ALT 250 FOR IP): Performed by: INTERNAL MEDICINE

## 2024-02-05 PROCEDURE — 6360000002 HC RX W HCPCS

## 2024-02-05 PROCEDURE — 6370000000 HC RX 637 (ALT 250 FOR IP)

## 2024-02-05 PROCEDURE — 2580000003 HC RX 258: Performed by: NURSE PRACTITIONER

## 2024-02-05 PROCEDURE — 6360000002 HC RX W HCPCS: Performed by: NURSE PRACTITIONER

## 2024-02-05 PROCEDURE — 2580000003 HC RX 258: Performed by: INTERNAL MEDICINE

## 2024-02-05 PROCEDURE — 83735 ASSAY OF MAGNESIUM: CPT

## 2024-02-05 PROCEDURE — 80048 BASIC METABOLIC PNL TOTAL CA: CPT

## 2024-02-05 PROCEDURE — 93306 TTE W/DOPPLER COMPLETE: CPT

## 2024-02-05 PROCEDURE — 6360000002 HC RX W HCPCS: Performed by: INTERNAL MEDICINE

## 2024-02-05 PROCEDURE — 97165 OT EVAL LOW COMPLEX 30 MIN: CPT

## 2024-02-05 PROCEDURE — 85025 COMPLETE CBC W/AUTO DIFF WBC: CPT

## 2024-02-05 PROCEDURE — 84100 ASSAY OF PHOSPHORUS: CPT

## 2024-02-05 PROCEDURE — 97116 GAIT TRAINING THERAPY: CPT

## 2024-02-05 PROCEDURE — 36415 COLL VENOUS BLD VENIPUNCTURE: CPT

## 2024-02-05 RX ORDER — SENNOSIDES A AND B 8.6 MG/1
1 TABLET, FILM COATED ORAL ONCE
Status: COMPLETED | OUTPATIENT
Start: 2024-02-05 | End: 2024-02-05

## 2024-02-05 RX ORDER — METOPROLOL TARTRATE 50 MG/1
100 TABLET, FILM COATED ORAL 2 TIMES DAILY
Status: DISCONTINUED | OUTPATIENT
Start: 2024-02-05 | End: 2024-02-07 | Stop reason: HOSPADM

## 2024-02-05 RX ORDER — MAGNESIUM SULFATE 1 G/100ML
1000 INJECTION INTRAVENOUS ONCE
Status: COMPLETED | OUTPATIENT
Start: 2024-02-05 | End: 2024-02-05

## 2024-02-05 RX ORDER — METOPROLOL TARTRATE 1 MG/ML
5 INJECTION, SOLUTION INTRAVENOUS EVERY 6 HOURS PRN
Status: DISCONTINUED | OUTPATIENT
Start: 2024-02-05 | End: 2024-02-07 | Stop reason: HOSPADM

## 2024-02-05 RX ORDER — METOPROLOL TARTRATE 50 MG/1
50 TABLET, FILM COATED ORAL ONCE
Status: COMPLETED | OUTPATIENT
Start: 2024-02-05 | End: 2024-02-05

## 2024-02-05 RX ORDER — LEVOFLOXACIN 500 MG/1
500 TABLET, FILM COATED ORAL DAILY
Status: DISCONTINUED | OUTPATIENT
Start: 2024-02-05 | End: 2024-02-07 | Stop reason: HOSPADM

## 2024-02-05 RX ADMIN — MIRTAZAPINE 30 MG: 15 TABLET, FILM COATED ORAL at 21:14

## 2024-02-05 RX ADMIN — POTASSIUM CHLORIDE AND SODIUM CHLORIDE: 900; 150 INJECTION, SOLUTION INTRAVENOUS at 13:08

## 2024-02-05 RX ADMIN — METOPROLOL TARTRATE 100 MG: 50 TABLET ORAL at 21:17

## 2024-02-05 RX ADMIN — METOPROLOL TARTRATE 50 MG: 50 TABLET ORAL at 08:09

## 2024-02-05 RX ADMIN — POTASSIUM CHLORIDE AND SODIUM CHLORIDE 100 ML/HR: 900; 150 INJECTION, SOLUTION INTRAVENOUS at 23:27

## 2024-02-05 RX ADMIN — SENNOSIDES 8.6 MG: 8.6 TABLET, COATED ORAL at 08:09

## 2024-02-05 RX ADMIN — APIXABAN 5 MG: 2.5 TABLET, FILM COATED ORAL at 08:09

## 2024-02-05 RX ADMIN — APIXABAN 5 MG: 2.5 TABLET, FILM COATED ORAL at 21:13

## 2024-02-05 RX ADMIN — MAGNESIUM SULFATE HEPTAHYDRATE 1000 MG: 1 INJECTION, SOLUTION INTRAVENOUS at 09:08

## 2024-02-05 RX ADMIN — VANCOMYCIN HYDROCHLORIDE 1250 MG: 1.25 INJECTION, POWDER, LYOPHILIZED, FOR SOLUTION INTRAVENOUS at 06:08

## 2024-02-05 RX ADMIN — PIPERACILLIN AND TAZOBACTAM 3375 MG: 3; .375 INJECTION, POWDER, FOR SOLUTION INTRAVENOUS at 04:41

## 2024-02-05 RX ADMIN — ATORVASTATIN CALCIUM 80 MG: 40 TABLET, FILM COATED ORAL at 08:09

## 2024-02-05 RX ADMIN — BENZONATATE 100 MG: 100 CAPSULE ORAL at 21:17

## 2024-02-05 RX ADMIN — METOPROLOL TARTRATE 50 MG: 50 TABLET ORAL at 10:28

## 2024-02-05 RX ADMIN — LEVOFLOXACIN 500 MG: 500 TABLET, FILM COATED ORAL at 10:28

## 2024-02-05 RX ADMIN — THERA TABS 1 TABLET: TAB at 08:09

## 2024-02-05 RX ADMIN — PREDNISONE 40 MG: 20 TABLET ORAL at 08:09

## 2024-02-05 ASSESSMENT — PAIN SCALES - GENERAL
PAINLEVEL_OUTOF10: 0

## 2024-02-05 NOTE — FLOWSHEET NOTE
02/05/24 1400   Vitals   Pulse 90   Respirations 27   /82   MAP (Calculated) 96   MAP (mmHg) 94

## 2024-02-05 NOTE — PLAN OF CARE
Nutrition Assessment     Type and Reason for Visit: Reassess    Nutrition Recommendations/Plan:   Continue liberalized diet order  Continue oral nutrition supplement TID with meals  Monitor Mag, phos and K+; replete as needed  Continue remeron for appetite stimulant     Malnutrition Assessment:  Malnutrition Status: Severe malnutrition    Nutrition Assessment:  Pt's PO intake slighlty improved since initial nutrition assessment. Pt drinking 50% or less of oral nutrition supplement received with meals. He endorses GI distress secondary to underlying comorbidities and treatment. Noted hypokalemia and replacement of magnesium; phos on 2/5/24 was 2.0. Monitor phos, magnesium and potassium; replete as needed. Continue remeron for appetite encouragement, and liberalized diet order.    Estimated Daily Nutrient Needs:  Energy (kcal):  7275-3611 (25-30kcal/kg IBW) Weight Used for Energy Requirements: Ideal     Protein (g):   (1.0-1.2g/kg IBW) Weight Used for Protein Requirements: Ideal        Fluid (ml/day):  ~2L fluid daily Method Used for Fluid Requirements: 1 ml/kcal    Nutrition Related Findings:   severe protein calorie malnutrition Wound Type: None    Current Nutrition Therapies:    ADULT DIET; Regular  ADULT ORAL NUTRITION SUPPLEMENT; Breakfast, Lunch, Dinner; Standard High Calorie/High Protein Oral Supplement    Anthropometric Measures:  Height: 188 cm (6' 2\")  Current Body Wt: 60.3 kg (133 lb)   BMI: 17.1    Nutrition Diagnosis:   Severe malnutrition related to increase demand for energy/nutrients (lung cancer w mets to liver) as evidenced by weight loss greater than or equal to 5% in 1 month    Nutrition Interventions:   Food and/or Nutrient Delivery: Continue Current Diet, Vitamin Supplement, Continue Oral Nutrition Supplement  Nutrition Education/Counseling: Education completed  Coordination of Nutrition Care: Continue to monitor while inpatient  Plan of Care discussed with: Pt    Goals:  Previous Goal Met:

## 2024-02-05 NOTE — CONSULTS
CARDIOLOGY CONSULTATION    REASON FOR CONSULT: SVT    REQUESTING PROVIDER: Salvador Mckeon MD     CHIEF COMPLAINT: Fast heart rate    HISTORY OF PRESENT ILLNESS:  Javi Carbajal is a 73 y.o. year-old male with past medical history significant for hypertension, a flutter/fibrillation on oral anticoagulation, metastatic lung cancer to liver s/p radiation, currently receiving chemo, chronic hepatitis C and tobacco use disorder who was evaluated today due to SVT.  On arrival to ED EMS reported HR in the 200s at home.  Adenosine administered with no effect.  In the ED two additional dose adenosine given with no improvement, underlying rhythm noted to have P waves.  He received Cardizem bolus and started on drip.  Heart rate went back up in the 200s and was cardioverted x 2 with improvement in rhythm.  Brief period of A-fib was noted.  Cardizem drip transition to Amiodarone drip with bolus on admission.  Patient reports having increase in malaise, productive cough with white phlegm, fever and shortness of breath over the last 2 to 3 weeks.  States having a poor appetite at home with weight loss.  No nausea or vomiting.  He is followed by [facility] for lung cancer with mets to the liver. S/p radiation and currently receiving chemotherapy with last infusion January 8, 2024.  He denies any chest pain, palpitations or shortness of breath at rest this morning.  Endorses weakness.  No further complaints at this time.    Records from hospital admission course thus far reviewed.      Telemetry reviewed.  No acute events overnight.  ST, 110-120's    INPATIENT MEDICATIONS:  Home medications reviewed.    Current Facility-Administered Medications:     levoFLOXacin (LEVAQUIN) tablet 500 mg, 500 mg, Oral, Daily, Pako Quiles MD    metoprolol tartrate (LOPRESSOR) tablet 100 mg, 100 mg, Oral, BID, Janeen Levi APRN - CNP    metoprolol tartrate (LOPRESSOR) tablet 50 mg, 50 mg, Oral, Once, Berry, Janeen A,

## 2024-02-05 NOTE — FLOWSHEET NOTE
0655-bedside shift report received, patient resting,call light in reach    0718-patient assessment performed, patient remains a/o c4, follows commands, ST on monitor(109), lungs sounds dim on RA, + sputum production( clear), ABD soft/flat/ non- tender, skin intact, Left chest subclavian infusing IVF and Amioadarone, patient repositioned. Call light in reach   02/05/24 0718   Vitals   Temp 98.2 °F (36.8 °C)   Temp Source Oral   Pulse 95   Heart Rate Source Monitor   Respirations 17   BP (!) 145/90   MAP (Calculated) 108   MAP (mmHg) 107   BP Location Left upper arm   BP Upper/Lower Upper   BP Method Automatic   Pain Assessment   Pain Assessment None - Denies Pain   Pain Level 0   Opioid-Induced Sedation   POSS Score 1   RASS   Little Agitation Sedation Scale (RASS) 0   Oxygen Therapy   SpO2 97 %   Pulse Oximeter Device Mode Intermittent       0730-patient assisted with meal tray setup.    0809-AM medications given  0824-ECHO at bedside    0910-[facility] NP at bedside; patient partner updated by NP    1028-Amiodarone drip paused, PO 50mg lopressor given    1045-MD at bedside for rounding    1100-HR WNL, <100    1115-Bed bath provided by patient partner    1158-Current HR 86, patient resting w/o distress     1250-patient resting with eyes closed, no distress, call light in reach    1400-patient resting with eyes closed    1530-PT at bedside with patient     1540-patient ambulating on unit, HR remained low 110's, no distress. Resting .    1700-patient in bed,watching tv, denies any distress, call light in reach    1800-patient watching tv, no distress, call light in reach.

## 2024-02-06 PROCEDURE — 2000000000 HC ICU R&B

## 2024-02-06 PROCEDURE — 6360000002 HC RX W HCPCS: Performed by: NURSE PRACTITIONER

## 2024-02-06 PROCEDURE — 97116 GAIT TRAINING THERAPY: CPT

## 2024-02-06 PROCEDURE — 6370000000 HC RX 637 (ALT 250 FOR IP)

## 2024-02-06 PROCEDURE — 6370000000 HC RX 637 (ALT 250 FOR IP): Performed by: NURSE PRACTITIONER

## 2024-02-06 PROCEDURE — 6370000000 HC RX 637 (ALT 250 FOR IP): Performed by: INTERNAL MEDICINE

## 2024-02-06 PROCEDURE — 2500000003 HC RX 250 WO HCPCS

## 2024-02-06 RX ORDER — METOPROLOL TARTRATE 100 MG/1
100 TABLET ORAL 2 TIMES DAILY
Qty: 60 TABLET | Refills: 3 | Status: SHIPPED | OUTPATIENT
Start: 2024-02-06

## 2024-02-06 RX ORDER — LEVOFLOXACIN 500 MG/1
500 TABLET, FILM COATED ORAL DAILY
Qty: 5 TABLET | Refills: 0 | Status: SHIPPED | OUTPATIENT
Start: 2024-02-07 | End: 2024-02-07

## 2024-02-06 RX ADMIN — APIXABAN 5 MG: 2.5 TABLET, FILM COATED ORAL at 08:07

## 2024-02-06 RX ADMIN — METOPROLOL TARTRATE 100 MG: 50 TABLET ORAL at 20:48

## 2024-02-06 RX ADMIN — DILTIAZEM HYDROCHLORIDE 30 MG: 30 TABLET ORAL at 09:53

## 2024-02-06 RX ADMIN — MIRTAZAPINE 30 MG: 15 TABLET, FILM COATED ORAL at 20:46

## 2024-02-06 RX ADMIN — METOPROLOL TARTRATE 5 MG: 5 INJECTION INTRAVENOUS at 17:36

## 2024-02-06 RX ADMIN — THERA TABS 1 TABLET: TAB at 08:07

## 2024-02-06 RX ADMIN — ATORVASTATIN CALCIUM 80 MG: 40 TABLET, FILM COATED ORAL at 08:07

## 2024-02-06 RX ADMIN — POTASSIUM CHLORIDE AND SODIUM CHLORIDE 100 ML/HR: 900; 150 INJECTION, SOLUTION INTRAVENOUS at 20:52

## 2024-02-06 RX ADMIN — METOPROLOL TARTRATE 100 MG: 50 TABLET ORAL at 08:07

## 2024-02-06 RX ADMIN — LEVOFLOXACIN 500 MG: 500 TABLET, FILM COATED ORAL at 08:07

## 2024-02-06 RX ADMIN — POTASSIUM CHLORIDE AND SODIUM CHLORIDE: 900; 150 INJECTION, SOLUTION INTRAVENOUS at 10:00

## 2024-02-06 RX ADMIN — DILTIAZEM HYDROCHLORIDE 30 MG: 30 TABLET ORAL at 17:24

## 2024-02-06 RX ADMIN — APIXABAN 5 MG: 2.5 TABLET, FILM COATED ORAL at 20:47

## 2024-02-06 ASSESSMENT — PAIN SCALES - GENERAL
PAINLEVEL_OUTOF10: 0

## 2024-02-06 NOTE — PLAN OF CARE
Problem: Nutrition Deficit:  Goal: Optimize nutritional status  Outcome: Not Progressing     Problem: Gastrointestinal - Adult  Goal: Maintains adequate nutritional intake  Outcome: Not Progressing     Problem: ABCDS Injury Assessment  Goal: Absence of physical injury  Outcome: Progressing     Problem: Safety - Adult  Goal: Free from fall injury  Outcome: Progressing     Problem: Skin/Tissue Integrity  Goal: Absence of new skin breakdown  Description: 1.  Monitor for areas of redness and/or skin breakdown  2.  Assess vascular access sites hourly  3.  Every 4-6 hours minimum:  Change oxygen saturation probe site  4.  Every 4-6 hours:  If on nasal continuous positive airway pressure, respiratory therapy assess nares and determine need for appliance change or resting period.  Outcome: Progressing     Problem: Cardiovascular - Adult  Goal: Maintains optimal cardiac output and hemodynamic stability  Outcome: Progressing  Goal: Absence of cardiac dysrhythmias or at baseline  Outcome: Progressing     Problem: Nutrition Deficit:  Goal: Optimize nutritional status  Outcome: Not Progressing     Problem: Gastrointestinal - Adult  Goal: Maintains adequate nutritional intake  Outcome: Not Progressing

## 2024-02-06 NOTE — FLOWSHEET NOTE
02/06/24 0703   Vitals   Temp 97.6 °F (36.4 °C)   Temp Source Oral   Pulse 97   Heart Rate Source Monitor   Respirations 18   BP (!) 131/95   MAP (Calculated) 107   MAP (mmHg) 106   Cardiac Rhythm Sinus rhythm   Pain Assessment   Pain Assessment None - Denies Pain   Pain Level 0   Opioid-Induced Sedation   POSS Score 1   RASS   Little Agitation Sedation Scale (RASS) 0   Oxygen Therapy   SpO2 96 %   O2 Device None (Room air)

## 2024-02-07 VITALS
WEIGHT: 121.4 LBS | DIASTOLIC BLOOD PRESSURE: 99 MMHG | RESPIRATION RATE: 21 BRPM | TEMPERATURE: 97.7 F | OXYGEN SATURATION: 96 % | SYSTOLIC BLOOD PRESSURE: 143 MMHG | BODY MASS INDEX: 15.58 KG/M2 | HEIGHT: 74 IN | HEART RATE: 94 BPM

## 2024-02-07 PROCEDURE — 6370000000 HC RX 637 (ALT 250 FOR IP): Performed by: INTERNAL MEDICINE

## 2024-02-07 PROCEDURE — 6360000002 HC RX W HCPCS: Performed by: NURSE PRACTITIONER

## 2024-02-07 PROCEDURE — 6370000000 HC RX 637 (ALT 250 FOR IP): Performed by: NURSE PRACTITIONER

## 2024-02-07 PROCEDURE — 6370000000 HC RX 637 (ALT 250 FOR IP)

## 2024-02-07 RX ORDER — DILTIAZEM HYDROCHLORIDE 180 MG/1
180 CAPSULE, COATED, EXTENDED RELEASE ORAL DAILY
Qty: 30 CAPSULE | Refills: 3 | Status: SHIPPED | OUTPATIENT
Start: 2024-02-08

## 2024-02-07 RX ORDER — DILTIAZEM HYDROCHLORIDE 180 MG/1
180 CAPSULE, COATED, EXTENDED RELEASE ORAL DAILY
Status: DISCONTINUED | OUTPATIENT
Start: 2024-02-07 | End: 2024-02-07 | Stop reason: HOSPADM

## 2024-02-07 RX ORDER — LEVOFLOXACIN 500 MG/1
500 TABLET, FILM COATED ORAL DAILY
Qty: 2 TABLET | Refills: 0 | Status: SHIPPED | OUTPATIENT
Start: 2024-02-07 | End: 2024-02-09

## 2024-02-07 RX ADMIN — DILTIAZEM HYDROCHLORIDE 30 MG: 30 TABLET ORAL at 06:12

## 2024-02-07 RX ADMIN — LEVOFLOXACIN 500 MG: 500 TABLET, FILM COATED ORAL at 08:07

## 2024-02-07 RX ADMIN — THERA TABS 1 TABLET: TAB at 08:07

## 2024-02-07 RX ADMIN — ATORVASTATIN CALCIUM 80 MG: 40 TABLET, FILM COATED ORAL at 08:07

## 2024-02-07 RX ADMIN — DILTIAZEM HYDROCHLORIDE 30 MG: 30 TABLET ORAL at 00:04

## 2024-02-07 RX ADMIN — APIXABAN 5 MG: 2.5 TABLET, FILM COATED ORAL at 08:07

## 2024-02-07 RX ADMIN — POTASSIUM CHLORIDE AND SODIUM CHLORIDE: 900; 150 INJECTION, SOLUTION INTRAVENOUS at 08:07

## 2024-02-07 RX ADMIN — METOPROLOL TARTRATE 100 MG: 50 TABLET ORAL at 08:07

## 2024-02-07 RX ADMIN — DILTIAZEM HYDROCHLORIDE 180 MG: 180 CAPSULE, EXTENDED RELEASE ORAL at 08:48

## 2024-02-07 ASSESSMENT — PAIN SCALES - GENERAL
PAINLEVEL_OUTOF10: 0

## 2024-02-07 NOTE — PROGRESS NOTES
CARDIOLOGY PROGRESS NOTE      Patient Name: Javi Carbajal  Age: 73 y.o.  Gender:male  :1950  MRN: [id number]    HISTORY OF PRESENT ILLNESS:  Javi Carbajal is a 73 y.o. year-old male with past medical history significant for hypertension, a flutter/fibrillation on oral anticoagulation, metastatic lung cancer to liver s/p radiation, currently receiving chemo, chronic hepatitis C and tobacco use disorder who was evaluated today due to SVT.  On arrival to ED EMS reported HR in the 200s at home.  Adenosine administered with no effect.  In the ED two additional dose adenosine given with no improvement, underlying rhythm noted to have P waves.  He received Cardizem bolus and started on drip.  Heart rate went back up in the 200s and was cardioverted x 2 with improvement in rhythm.  Brief period of A-fib was noted.  Cardizem drip transition to Amiodarone drip with bolus on admission.  Patient reports having increase in malaise, productive cough with white phlegm, fever and shortness of breath over the last 2 to 3 weeks.  States having a poor appetite at home with weight loss.  No nausea or vomiting.  He is followed by [facility] for lung cancer with mets to the liver. S/p radiation and currently receiving chemotherapy with last infusion 2024.  He denies any chest pain, palpitations or shortness of breath at rest this morning.  Endorses weakness.  No further complaints at this time.     Interval  2024: Uneventful night.  Patient resting in bed alert and oriented.  Denies any chest pain, shortness of breath or palpitations this morning.  Heart rate remains slightly elevated overnight.  Plan to start p.o. diltiazem today.  Discussed pursuing outpatient ischemic eval along with outpatient cardiac monitor.  He is agreeable to see us at [facility] or cardiology at discharge.    Telemetry reviewed, there were no acute events noted in the past 24 hours.  Sinus tach on telemetry this a.m., 
    CARDIOLOGY PROGRESS NOTE      Patient Name: Javi Carbajal  Age: 73 y.o.  Gender:male  :1950  MRN: [id number]    HISTORY OF PRESENT ILLNESS:  Javi Carbajal is a 73 y.o. year-old male with past medical history significant for hypertension, a flutter/fibrillation on oral anticoagulation, metastatic lung cancer to liver s/p radiation, currently receiving chemo, chronic hepatitis C and tobacco use disorder who was evaluated today due to SVT.  On arrival to ED EMS reported HR in the 200s at home.  Adenosine administered with no effect.  In the ED two additional dose adenosine given with no improvement, underlying rhythm noted to have P waves.  He received Cardizem bolus and started on drip.  Heart rate went back up in the 200s and was cardioverted x 2 with improvement in rhythm.  Brief period of A-fib was noted.  Cardizem drip transition to Amiodarone drip with bolus on admission.  Patient reports having increase in malaise, productive cough with white phlegm, fever and shortness of breath over the last 2 to 3 weeks.  States having a poor appetite at home with weight loss.  No nausea or vomiting.  He is followed by [facility] for lung cancer with mets to the liver. S/p radiation and currently receiving chemotherapy with last infusion 2024.  He denies any chest pain, palpitations or shortness of breath at rest this morning.   No further complaints at this time.     Interval  2024: Uneventful night.  Patient resting in bed alert and oriented.  Denies any chest pain, shortness of breath or palpitations this morning.  Heart rate remains slightly elevated overnight.  Plan to start p.o. diltiazem today.  Discussed pursuing outpatient ischemic eval along with outpatient cardiac monitor.    2024: Patient resting in bed this morning in no acute distress.  States feeling much better and ready to be discharged today.  Improvement in heart rate overnight with addition to oral 
  Physician Progress Note      PATIENT:               KASH AC  CSN #:                  [id number]  :                       1950  ADMIT DATE:       2024 12:30 PM  DISCH DATE:  RESPONDING  PROVIDER #:        Pako Quiles MD          QUERY TEXT:    Pt admitted with SVT and Sepsis and has protein calorie malnutrition   documented in H&P and Progress notes with Severe malnutrition noted in RD   consult note. Please further specify type of malnutrition with documentation   in the medical record.    The medical record reflects the following:  Risk Factors: 72 y/o, lung CA, Liver CA, chronic hep C, poor appetite and   intake reported    Clinical Indicators: RD Malnutrition Assessment:  * Status:  Severe malnutrition (24 1402)  * Context:  Chronic Illness  * Findings of the 6 clinical characteristics of malnutrition: (+ findings)  +  Energy Intake:  Mild decrease in energy intake (Comment) (Sig decrease in   intake x1 week)  +  Weight Loss:  Greater than 5% over 1 month  +  Body Fat Loss:  Severe body fat loss Orbital, Triceps, Fat Overlying Ribs,   Buccal region  +  Muscle Mass Loss:  Severe muscle mass loss Temples (temporalis), Clavicles   (pectoralis & deltoids), Thigh (quadraceps), Calf (gastrocnemius), Hand   (interosseous), Scapula (trapezius)    Treatment: RD Consult with Nutrition Recommendations/Plan:  1. Liberalized diet to Regular  2. Order Ensure Enlive TID (provides 350kcal, 16g pro each)  3. Ordered daily Multivitamin    ASPEN Criteria:    https://aspenjournals.onlinelibrary.merida.com/doi/full/10.1177/439361526150473  5    Thank you,  Tigist ABRAHAM, RN, CCDS  Please contact me for any questions or concerns regarding this query at   [email or web address]  Options provided:  -- Severe Protein calorie malnutrition  -- Other - I will add my own diagnosis  -- Disagree - Not applicable / Not valid  -- Disagree - Clinically unable to determine / Unknown  -- Refer to Clinical 
 conducted an initial consultation and Spiritual Assessment for Javi Carbajal, who is a 73 y.o.,male. Patient’s Primary Language is: English.   According to the patient’s EMR Hindu Affiliation is: Other.     The reason the Patient came to the hospital is:   Patient Active Problem List    Diagnosis Date Noted    Severe protein-calorie malnutrition (HCC) 02/03/2024    Pneumonia due to organism 02/02/2024        The  provided the following Interventions:  Initiated a relationship of care and support.   Explored issues of ambrose, belief, spirituality and Alevism/ritual needs while hospitalized.  Listened empathically.  Provided chaplaincy education.  Provided information about Spiritual Care Services.  Offered prayer and assurance of continued prayers on patient's behalf.   Chart reviewed.    The following outcomes where achieved:  Patient shared limited information about their medical narrative.   Patient expressed gratitude for 's visit.    Assessment:  Patient does not have any Alevism/cultural needs that will affect patient’s preferences in health care.  There are no spiritual or Alevism issues which require intervention at this time.     Plan:  Chaplains will continue to follow and will provide pastoral care on an as needed/requested basis.   recommends bedside caregivers page  on duty if patient shows signs of acute spiritual or emotional distress.         Maria Teresa Canales  Spiritual Care   [phone]  
-1850 Received care of pt from off going nurse.     -1908 PM Assessment completed.  A&OX4.  Resp even and non-labored.  Lung sounds clear I upper lobes, diminished in bases. SATS 98% on RA.  Skin warm an dry  TLC to left chest wall intact. Pt using urinal without any difficulty.    -2047 Pt took po pm meds.  Pt continues to cough up thick clear secretions after taking po meds.  CBWR, bed in ;lowest position.  NAD noted.    -0004 Pt took scheduled po meds.  Pt denies any complaints at present.  SATS 98% on RA.      -0205 Hourly rounding done at this time.  Pt resting quietly in bed.  VSS.      -0410 Pt resting quietly in bed with eyes closed.  NAD noted.    -0612 Pt easily arousal to take scheduled am po meds.  CBWR, bed in lowest position.      -0700    Bedside shift change report given to ERON Yarbrough RN (oncoming nurse) by MATT Acuna RN (offgoing nurse). Report included the following information Intake/Output, MAR, Recent Results, and Cardiac Rhythm NSR .    
-1855 Received care of pt from off going nurse.     -1908 PM  Assessment  completed.  A&OX4.  Skin warm and dry.  TLC to left chest wall intact.  Resp even and non-labored.  Lung sounds clear in upper lobes, diminished n bases. SATS 97% on RA.  Pt has productive cough, coughing up clear secretions.  Pt denies any complaints of pain at present.       -2117   Pt continues to have coughing episodes after taking po pm meds. Tessalon 100 mg po given.  CBWR, bed in lowest position.  NAD noted.    -0003 Hourly rounding done at this time.  Pt resting quietly in bed.  VSS.      -0204 Pt easily arousal at present. Pt denies any complaints at present.HR 80-90's, NSR on cardiac monitor.  NAD noted.    -0350 Pt awake and confused at present.  Pt is easily reoriented.  CBWR, bed in lowest position.      -0550 Pt remains confused.  This nurse reorienting and reassuring pt.  Pt asking to use telephone to call girlfriend.  Pt talking on phone.  This nurse talking to pt girlfriend and answering questions.    -0605 Pt stated, \"I don't know why I'm so confused.\"  This nurse reassuring pt.  CBWR, bed in lowest positioned.    -0653 Bedside shift change report given to ERON Yarbrough RN (oncoming nurse) by MATT Acuna RN (offgoing nurse). Report included the following information Intake/Output, MAR, Recent Results, and Cardiac Rhythm NSR .     
0645-bedside shift report received, patient resting quietly with eyes closed.    0703-patient assessment performed, patient remains a/o x 3, follows commands, NS on the monitor, VSS, no temps, lungs sounds clear on RA, ABD soft and flat/ bowel sounds active, left chest TLC infusing IVF,  POC reviewed, call light in reach.    0730-patient assisted with meal tray setup    0830-patient partner at bedside    0920-patient assited to BR; one soft BM     0940-MD rounding, verbal order to given 30mg po Cardizem.    0953-PO Cardizem given    1100-patient resting, no distress, HR remains in 90's since PO Cardizem    1135-Call received from patient partner Syeda, Update given, [facility] NP provided update as to reason for patient Discharged date pushed back until tomorrow.    1300-patient resting    1423-PT/OT present    1450-patient assisted back to bed; post PT/OT, patient w/o distress,     1600-patient resting with eyes closed, no distress noted, call light in reach    1724- 30MG scheduled PO Cardizem given    1736- PRN Lopressor 5mg given for HR sustaining 120's    1800-HR 90, patient resting, no distress, call light in reach   
0700-Beside shift report received from SHANA Glasgow RN. Assumed care of patient. Patient AxOx4. No c/o pain. Bed in lowest position. CBWR.     0800-Dr. Chanel at bedside assessing pt.     0820-AM medications given- Education Provided. Pt tolerated well.     1100-Pt resting. No distress noted.    1330-Pt HR sustaining in 120-125. Asymptomatic. Miky NP notified-see new orders.    1338-Metoprolol 5mg IV given. . Pt tolerated well.     1525-Pt HR sustaining in 120's. Asymptomatic. Miky NP notified-see new orders.    1610-Family at bedside.    1800-Pt resting, No distress noted     1851-Offgoing bedside shift report given to SHANA Glasgow RN.     
0700-Beside shift report received from SHANA Glasgow RN. Assumed care of patient. Patient AxOx4. No c/o pain. Bed in lowest position. CBWR.    0847-AM medications given- Education Provided. Pt tolerated well.     1200-Pt resting. No distress noted. CBWR.    1330-Visitors at bedside.    1420-Patients HR sustaining in 120's. Asymptomatic. Miky, NP notified-see new orders.    1730-Patients HR sustaining in 130's. Miky NP notified-see new orders.    1853-Offgoing bedside shift report given to SHANA Glasgow RN.     
1540-patient ambulating on unit, HR remained low 110's, no distress.     1600-Resting .    
1845- Bedside shift change report given to SERGE Dorantes (oncoming nurse) by ERON Yarbrough RN (offgoing nurse). Report included the following information Nurse Handoff Report, Index, ED Encounter Summary, ED SBAR, Intake/Output, MAR, Recent Results, and Cardiac Rhythm ST . Received patient resting in bed, watching tv. Bed in lowest and locked position, CBWR.     1915- Patient attempts to get OOB to use bathroom, WILLIAMSON and tachycardic, patient education provided at this time. Patient verbalizes understanding and agrees to use urinal.     2100- PO medications given without difficulty. Patient denies pain or needs at this time.    2300- O2 flow rate decreased at this time to 2L/min.     0045- Reassessment completed at this time, no changes noted. Patient rests in bed with eyes closed, respirations even and unlabored. VSS.     0215- Patient continues to rest in bed with eyes closed, no evidence of distress or discomfort at this time.     0400- Reassessment completed at this time, no changes noted. O2 flow rate off at this time. Patient now on RA. Respirations even and unlabored, no evidence of distress at this time. Cardizem gtt continue to titrate as per order parameters. VSS.    0515- Morning labs drawn at this time.     0645- Bedside shift change report given to SHANA Tomlin RN (oncoming nurse) by SERGE Dorantes (offgoing nurse). Report included the following information Nurse Handoff Report, Index, ED Encounter Summary, ED SBAR, Intake/Output, MAR, Recent Results, and Cardiac Rhythm SR .     
1845- Bedside shift change report given to SERGE Dorantes (oncoming nurse) by SERGE Evans (offgoing nurse). Report included the following information Nurse Handoff Report, Index, ED Encounter Summary, ED SBAR, Intake/Output, MAR, Recent Results, and Cardiac Rhythm ST . Received patient resting in bed, watching tv. Bed in lowest and locked position, CBWR.     2015- RN to bedside for shift assessment. Patient calm and cooperative, A+Ox4. Patient denies pain or needs at this time, respirations even and unlabored, VSS.     2100- RN to bedside for scheduled PO medications. Patient requests to take off O2, NC removed at this time. Saturations >95% on monitor. Patient exhibiting signs of aspiration during pill swallowing, immediate wet cough results with copious amounts of thick sputum. Primary RN questions patient if this happens when eating as well, to which patient replies it does, but not as bad as with thin liquids. Primary RN to express concerns of aspiration to provider.     2300- Primary RN to bedside for scheduled and continuous gtts. Patient rests in bed with eyes closed, respirations even and unlabored. Intermittent SpO2 remains >94% on RA.     0105- Reassessment completed at this time, no changes noted. Patient rests in bed with eyes closed, respirations even and unlabored, no evidence of distress or discomfort at this time. VSS    0445- Patient up to bathroom, ambulates independently with steady gait. Patient denies feeling light headed or dizziness.     0500- Patient returns to bed with steady gait, HR stable at 106bpm. Patient denies SOB or feeling palpitations during ambulation. Patient states formed BM was difficult to pass, requests senna in AM as he takes it at home PRN.     0645- Bedside shift change report given to ERON Yarbrough RN (oncoming nurse) by SERGE Dorantes (offgoing nurse). Report included the following information Nurse Handoff Report, Index, ED Encounter Summary, ED SBAR, Intake/Output, MAR, Recent 
1845- Bedside shift change report given to SERGE Dorantes (oncoming nurse) by SHANA Tomlin RN (offgoing nurse). Report included the following information Nurse Handoff Report, Index, ED Encounter Summary, ED SBAR, Intake/Output, MAR, Recent Results, and Cardiac Rhythm ST . Received patient resting in bed, watching tv. Bed in lowest and locked position, CBWR.     1915- Primary RN to bedside for shift assessment. Patient calm and cooperative, denies pain, SOB or needs at this time. Respirations even and unlabored, VSS.       2100- PO medications given at this time without difficulty.     0000- Reassessment completed at this time, no changes noted, patient rests in bed with respirations even and unlabored, no evidence of distress or discomfort. VSS    0300- Morning labs drawn at this time.     0645- Bedside shift change report given to SHANA Tomlin RN (oncoming nurse) by SERGE Dorantes (offgoing nurse). Report included the following information Nurse Handoff Report, Index, ED Encounter Summary, ED SBAR, Intake/Output, MAR, Recent Results, and Cardiac Rhythm SR .     
Admission Medication Reconciliation:    Information obtained [facility]:  [facility], [facility] medrec    Comments/Recommendations: Reviewed PTA medications and patient's allergies.    Reviewed and updated        Allergies:  Patient has no known allergies.    Significant PMH/Disease States:   Past Medical History:   Diagnosis Date    A-fib (HCC)     HTN (hypertension)     Liver cancer (HCC)     Lung cancer (HCC)      Chief Complaint for this Admission:    Chief Complaint   Patient presents with    Tachycardia     Prior to Admission Medications:   Prior to Admission medications    Medication Sig Start Date End Date Taking? Authorizing Provider   atorvastatin (LIPITOR) 80 MG tablet Take 1 tablet by mouth daily    Sarah Soto MD   benzonatate (TESSALON) 100 MG capsule Take 1 capsule by mouth 3 times daily as needed for Cough    Sarah Soto MD   apixaban (ELIQUIS) 5 MG TABS tablet Take 1 tablet by mouth 2 times daily    Sarah Soto MD   amLODIPine (NORVASC) 10 MG tablet Take 1 tablet by mouth daily    Sarah Soto MD   mirtazapine (REMERON) 15 MG tablet Take 1 tablet by mouth nightly  Patient not taking: Reported on 2/5/2024    Sarah Soto MD Jacob Buck, [facility]    
INTERNAL MEDICINE PROGRESS NOTE      Patient: Javi Carbajal   YOB: 1950   MRN: [id number]      Hospital course / Assessment and Plans   Principle Problems:  SVT; Atrial Fibrillation with RVR:  Admitted to ICU , started on Amio metoprolol and Eliquis cardiology consulted and plan to increase metoprolol to 100 mg BID and taper off Amiodarone, may home in AM if rate better controlled.   - Started on Eliquis   Sepsis, POA suspect 2/2  Multilobar Pneumonia, in immunocompromised patient:  -Influenza and COVID-negative continue , CXR no acute process  - Treated with Empirical Vanco and Zosyn IV , follow-up cultures NG, WBC normal, Afebrile  - de-escalate antibiotic to PO Levaquin   Hypokalemia:  - replaced   Hx of Lung Cancer, Liver Cancer, chronic Hepatitis C:  -He follows with oncology @ [facility].  - mild Ca++ elevation today, cont ivf  Protein calorie malnutrition, underweight:  -Nutrition consult.  Cont Remeron, encourage oral intake, offer nutritional supplements with meal trays.  Hypertension:  -chronic stable issues.        Full Code   DVT prophylaxis: pharmacologic and mechanical    Disposition / Plans   Disposition: home in AM       Subjective / ROS:   Patient states he is fine , no CP       Medical Decision Making   Chart, Images and Lab data reviewed, necessary medical Orders placed   Discussed with nursing staff     Vitals:    24 0700 24 0718 24 0800 24 0823   BP:  (!) 145/90 (!) 152/99 (!) 145/90   Pulse: 92 95 (!) 110 (!) 110   Resp:  17 (!) 32 25   Temp:  98.2 °F (36.8 °C)     TempSrc:  Oral     SpO2:  97%     Weight:    60.3 kg (133 lb)   Height:    1.88 m (6' 2\")     Temp (24hrs), Av.3 °F (36.8 °C), Min:97.8 °F (36.6 °C), Max:98.7 °F (37.1 °C)      Intake/Output Summary (Last 24 hours) at 2024 0956  Last data filed at 2024 0837  Gross per 24 hour   Intake 950 ml   Output 550 ml   Net 400 ml       Physical Exam:   General Appearance:   Appears 
INTERNAL MEDICINE PROGRESS NOTE      Patient: Javi Carbajal   YOB: 1950   MRN: [id number]      Hospital course / Assessment and Plans   Principle Problems:  SVT / Paroxysmal Atrial Fibrillation with RVR:  Presented with 's , troponin normal, USD negative, Initial EKG showed SVT, rate 159   Admitted to ICU , started on Amio metoprolol and Eliquis cardiology consulted and plan to increase metoprolol to 100 mg BID and taper off Amiodarone, may home in AM if rate better controlled.   - Started on Eliquis - [id number] 2 (HTN, age)   - Currently Sinus tachy at 100's   - Cardiology out patient follow up, added short acting Cardizem - may switched to long acting in Am if better controlled and d/c home on BB and CCB per cardiology   - need to add Cardizem to d/c medications   Sepsis, POA suspect 2/2  Multilobar Pneumonia, in immunocompromised patient:  -Influenza and COVID-negative, CXR no acute process  - Treated with Empirical Vanco and Zosyn IV , follow-up cultures NG, WBC normal, Afebrile  - de-escalate antibiotic to PO Levaquin   Hypokalemia:  - replaced   Hx of Lung Cancer, Liver Cancer, chronic Hepatitis C:  -He follows with oncology @ [facility].  - mild Ca++ elevation today, cont ivf  Protein calorie malnutrition, underweight:  -Nutrition consult.  Cont Remeron, encourage oral intake, offer nutritional supplements with meal trays.  Hypertension:  -chronic stable issues.      Full Code   DVT prophylaxis: pharmacologic and mechanical    Disposition / Plans   Disposition: home      Subjective / ROS:   Patient states he is fine      Medical Decision Making   Chart, Images and Lab data reviewed, necessary medical Orders placed   Discussed with nursing staff     Vitals:    02/06/24 0800 02/06/24 0807 02/06/24 0900 02/06/24 0953   BP: (!) 150/99 (!) 149/92 (!) 142/87 (!) 141/89   Pulse: (!) 112 (!) 110 95 92   Resp: 27 26 26    Temp:       TempSrc:       SpO2:       Weight:       Height:   
New admission from: ED @ 1800    Patient assessment 1800: patient a/ox 4, verbal, follows commands, patient denies HA/CP/SOB, 's-140-'s, Lungs sounds dim, patient on 6L HL NC, o2 sats 100%, ABD soft and non tender/ bowel sounds active,     IV: 22 Right  wrist, Left Subclavian TLC  Critical Drips: Amio @ 33.3ml/hr( 1mg/min)                        Cardizem @ 15mg/hr  Tele: ST  Wounds: None  Mobility: x 1 assist( standby)    Vitals: /81, ,   Pain: denies    Fall Interventions: Gripper socks, bed in lowest position, call light in reach, POC reviewed     IVF started, PO Lipitor given    1905-bedside shift report  given to SERGE Dorantes  
OCCUPATIONAL THERAPY EVALUATION/DISCHARGE    Patient: Javi Carbajal (73 y.o. male)  Date: 2/5/2024  Primary Diagnosis: Paroxysmal supraventricular tachycardia [I47.10]  Hypokalemia [E87.6]  Pneumonia due to organism [J18.9]  Septicemia (HCC) [A41.9]  History of liver cancer [Z85.05]  History of lung cancer [Z85.118]  Pneumonia due to infectious organism, unspecified laterality, unspecified part of lung [J18.9]       Precautions: fall, O2     PLOF: lives at home with wife and had been I with basic ADLs and mobility    ASSESSMENT AND RECOMMENDATIONS:  Based on the objective data described below, the patient presents with declines in basic ADLs and mobility.    Skilled occupational therapy is not indicated at this time.  Discharge Recommendations: None  Further Equipment Recommendations for Discharge: none      SUBJECTIVE:   Patient stated “Im ready to get home.”    OBJECTIVE DATA SUMMARY:     Past Medical History:   Diagnosis Date    A-fib (HCC)     HTN (hypertension)     Liver cancer (HCC)     Lung cancer (HCC)    History reviewed. No pertinent surgical history.  Barriers to Learning/Limitations: none   Compensate with: visual, verbal, tactile, kinesthetic cues/model    Home Situation:      [x]     Right hand dominant   []     Left hand dominant    Cognitive/Behavioral Status:    A& O x 3; follows basic commands              Skin: intact   Edema: none noted     Vision/Perceptual:        Wfls                               Coordination: BUE    Wfls             Balance:    Normal     Strength: BUE  4/5                  Tone & Sensation: BUE  Normal                           Range of Motion: BUE  Wfls                             Functional Mobility and Transfers for ADLs:  Bed Mobility:    Mod I            Transfers:       Mod I                                  ADL Assessment:    Feeding- mod I   Grooming- setup   UE ADLs- setup   LE ADLs- setup   Toileting- CGA          ADL Intervention:   No skilled OT indicated at 
Pharmacy Dosing Services:     Zosyn 3.375 gm IV q8h was changed to Zosyn 3.375 gm IV q6h     Indication: pneumonia ( HAP )   Serum Creatinine No results found for: \"LUPIS\", \"CREA\", \"CREAPOC\"   Creatinine Clearance Estimated Creatinine Clearance: 61 mL/min (based on SCr of 0.94 mg/dL).   BUN Lab Results   Component Value Date/Time    BUN 17 02/02/2024 02:36 PM         RENÉ TREVINO [facility] , [facility]  [phone]   
Physical Therapy  Facility/Department: [facility] 2 ICU  Daily Treatment Note  NAME: Javi Carbajal  : 1950  MRN: [id number]    Date of Service: 2024    Discharge Recommendations:  Outpatient PT        Patient Diagnosis(es): The primary encounter diagnosis was Pneumonia due to infectious organism, unspecified laterality, unspecified part of lung. Diagnoses of History of lung cancer, History of liver cancer, Septicemia (HCC), Paroxysmal supraventricular tachycardia, Hypokalemia, and Permanent atrial fibrillation (HCC) were also pertinent to this visit.    Assessment     Pt ambulated for 1 bout of 70ft. with SPC CGA. Pt required a rest break due to increased fatigue and elevated HR of 130. Pt ascended/descended 4 steps non-reciprocal CGA. Pt noted he felt exhausted after step negotiation. Left pt in chair with all needs met and call bell in reach.    Plan    Physical Therapy Plan  General Plan: Continue with current plan     Restrictions  Restrictions/Precautions  Restrictions/Precautions: Other (comment) (monitor SpO2 and HR)     Subjective    Subjective  Subjective: Pt asleep upon entry, notes he is not in any pain and is willing to participate in PT.     Objective   Vitals  Pulse: (!) 130  Bed Mobility Training  Bed Mobility Training: Yes  Supine to Sit: Modified independent  Balance  Sitting: Intact  Standing: Intact  Transfer Training  Transfer Training: Yes  Sit to Stand: Modified independent  Stand to Sit: Modified independent  Gait Training  Gait Training: Yes  Gait  Distance (ft): 70 Feet  Assistive Device: Cane, straight  Rail Use: Both  Stairs - Level of Assistance: Contact-guard assistance  Number of Stairs Trained: 4               Goals  Short Term Goals  Time Frame for Short Term Goals: 7 days  Short Term Goal 1: Pt will perform all standing transfers with MOD (I).  Short Term Goal 2: Pt will ambulate 100' with the LRAD with MOD (I).  Short Term Goal 3: Pt will navigate 2 steps with rails with MOD 
[facility]   Pharmacy Pharmacokinetic Monitoring Service - Vancomycin    Consulting Provider: OBA, NP   Indication: CAP  Target Concentration: Goal AUC/LAMONT 400-600 mg*hr/L  Day of Therapy: 2/7  Additional Antimicrobials: ZOSYN    Pertinent Laboratory Values:   Wt Readings from Last 1 Encounters:   02/02/24 60.3 kg (133 lb)     Temp Readings from Last 1 Encounters:   02/04/24 97.4 °F (36.3 °C) (Oral)     Estimated Creatinine Clearance: 83 mL/min (based on SCr of 0.69 mg/dL).  Recent Labs     02/03/24  0500 02/04/24  0400   CREATININE 0.75 0.69   BUN 13 10   WBC 14.4* 13.1         Recent vancomycin administrations                     vancomycin (VANCOCIN) 750 mg in sodium chloride 0.9 % 250 mL IVPB ([id number]) (mg) 750 mg New Bag 02/04/24 0535     750 mg New Bag 02/03/24 1736     750 mg New Bag  0531    VANCOMYCIN TROUGH DUE ON SUNDAY 2/4/24 AT 0400 (each) 1 each Given 02/04/24 0350                    Assessment:  Date/Time Current Dose Concentration Timing of Concentration (h) AUC   2/4/24 @ 0400 750MG IV Q 12 H  6.5 10.5 295   Note: Serum concentrations collected for AUC dosing may appear elevated if collected in close proximity to the dose administered, this is not necessarily an indication of toxicity    Plan:  Current dosing regimen is sub-therapeutic  Increase dose to 1250MG IV Q 12 H   Repeat vancomycin concentration ordered for 2/6/24 @ 0400   Pharmacy will continue to monitor patient and adjust therapy as indicated    Thank you for the consult,  Camryn Butcher RPH  2/4/2024 8:00 AM    
[facility]   Pharmacy Pharmacokinetic Monitoring Service - Vancomycin     Javi Carbajal is a 73 y.o. male starting on vancomycin therapy for CAP. Pharmacy consulted by OBHOANG, NP for monitoring and adjustment.    Target Concentration: Goal AUC/LAMONT 400-600 mg*hr/L        Pertinent Laboratory Values:   Wt Readings from Last 1 Encounters:   02/02/24 60.3 kg (133 lb)     Temp Readings from Last 1 Encounters:   01/22/24 98.1 °F (36.7 °C) (Oral)     Estimated Creatinine Clearance: 61 mL/min (based on SCr of 0.94 mg/dL).  Recent Labs     02/02/24  1325 02/02/24  1436   CREATININE  --  0.94   BUN  --  17   WBC 12.5  --          Plan:  Dosing recommendations based on Bayesian software  Start vancomycin 1500MG LOAD THEN 750MG IV Q 12 H X 7 DAYS   Anticipated AUC of 530 and trough concentration of 15.5 at steady state  Renal labs as indicated   Vancomycin concentration ordered for SUNDAY 2/4/24 @ 0400   Pharmacy will continue to monitor patient and adjust therapy as indicated    Thank you for the consult,  Camryn Butcher RPH  2/2/2024 4:55 PM    
RN reports episodes of intermittent sinus tachycardia with rates of up in the 130s to 140s.  He has had a dose of metoprolol 5 mg IV earlier, stat ECG--sinus tachycardia-122, normal pr, and qtc. Give ns bolus 500cc now, Cardizem 10 mg iv now, increase metoprolol tablets to 50 mg p.o. twice daily, pending echo and cardiology consult. He has been on amiodarone gtt>24 hours.  
RRT at bedside with ambu bag and suction for cardioversion x 2.  Patient given propofol and shocked x 2 with no complications.  Current -122 and SPO2 94% on NC 6 lpm.       02/02/24 1359   Oxygen Therapy/Pulse Ox   O2 Device Nasal cannula  (6L)   O2 Flow Rate (L/min) 6 L/min   Pulse (!) 117   SpO2 94 %       
improve breathing    Education  Patient Education  Education Given To: Patient  Education Provided: Role of Therapy;Plan of Care;Precautions;Transfer Training;Equipment;Fall Prevention Strategies  Education Method: Verbal  Barriers to Learning: None  Education Outcome: Verbalized understanding    Therapy Time   Individual Concurrent Group Co-treatment   Time In 0337         Time Out 0355         Minutes 18                 LALA Tomlin           
Frequency    magnesium sulfate 2000 mg in 50 mL IVPB premix  2,000 mg IntraVENous Q1H    vancomycin (VANCOCIN) 1,250 mg in sodium chloride 0.9 % 250 mL IVPB ([id number])  1,250 mg IntraVENous Q12H    predniSONE (DELTASONE) tablet 40 mg  40 mg Oral Daily    multivitamin 1 tablet  1 tablet Oral Daily    amiodarone (CORDARONE) 450 mg in dextrose 5 % 250 mL infusion  0.5 mg/min IntraVENous Continuous    dilTIAZem 125 mg in 0.9% sodium chloride 125 mL infusion  2.5-15 mg/hr IntraVENous Continuous    vancomycin (VANCOCIN) intermittent dosing (placeholder)  1 each Other RX Placeholder    piperacillin-tazobactam (ZOSYN) 3,375 mg in sodium chloride 0.9 % 50 mL IVPB ([id number])  3,375 mg IntraVENous Q6H    metoprolol tartrate (LOPRESSOR) tablet 25 mg  25 mg Oral BID    acetaminophen (TYLENOL) tablet 650 mg  650 mg Oral Q4H PRN    apixaban (ELIQUIS) tablet 5 mg  5 mg Oral BID    atorvastatin (LIPITOR) tablet 80 mg  80 mg Oral Daily    benzonatate (TESSALON) capsule 100 mg  100 mg Oral TID PRN    mirtazapine (REMERON) tablet 30 mg  30 mg Oral Nightly    0.9% NaCl with KCl 20 mEq infusion   IntraVENous Continuous     ______________________________________________________________________  EXPECTED LENGTH OF STAY: 4  ACTUAL LENGTH OF STAY:          2                 Martin Chanel MD   
chloride 0.9 % 250 mL IVPB ([id number])  750 mg IntraVENous Q12H    vancomycin (VANCOCIN) intermittent dosing (placeholder)  1 each Other RX Placeholder    [START ON 2/4/2024] VANCOMYCIN TROUGH DUE ON SUNDAY 2/4/24 AT 0400  1 each Other Once    piperacillin-tazobactam (ZOSYN) 3,375 mg in sodium chloride 0.9 % 50 mL IVPB ([id number])  3,375 mg IntraVENous Q6H    metoprolol tartrate (LOPRESSOR) tablet 25 mg  25 mg Oral BID    acetaminophen (TYLENOL) tablet 650 mg  650 mg Oral Q4H PRN    apixaban (ELIQUIS) tablet 5 mg  5 mg Oral BID    atorvastatin (LIPITOR) tablet 80 mg  80 mg Oral Daily    benzonatate (TESSALON) capsule 100 mg  100 mg Oral TID PRN    mirtazapine (REMERON) tablet 30 mg  30 mg Oral Nightly    0.9% NaCl with KCl 20 mEq infusion   IntraVENous Continuous    amiodarone (CORDARONE) 450 mg in dextrose 5 % 250 mL infusion  0.5 mg/min IntraVENous Continuous     ______________________________________________________________________  EXPECTED LENGTH OF STAY: 4  ACTUAL LENGTH OF STAY:          1                 Martin Chanel MD

## 2024-02-07 NOTE — FLOWSHEET NOTE
0645-BEDSIDE SHIFT REPORT RECEIVED,patient resting    0721-Patient assessment performed, a/ox 4, denies distress,  Sinus Tach on monitor, Lungs sound dim on RA, ABD flat, TLC to Left chest patent and infusing,       02/07/24 0721   Vitals   Temp 97.7 °F (36.5 °C)   Temp Source Oral   Pulse (!) 101   Heart Rate Source Monitor   Respirations 21   BP (!) 143/99   MAP (Calculated) 114   MAP (mmHg) 112   BP Location Left upper arm   BP Upper/Lower Upper   BP Method Automatic   Cardiac Rhythm Sinus tachy   Pain Assessment   Pain Assessment None - Denies Pain   Pain Level 0   Patient's Stated Pain Goal 0 - No pain   Opioid-Induced Sedation   POSS Score 1   RASS   Little Agitation Sedation Scale (RASS) 0   Oxygen Therapy   SpO2 96 %   O2 Device None (Room air)     0807-AM MEDS GIVEN  0848- mg Cardizem given per [facility] NP order; HR 94    0900-MD rounding; patient to be discharged     DC ORDERS NOTED    IV REMOVED/ central line removed( no bleeding noted  DC INSTRUCTIONS REVIEWED WITH PATIENT  ALL QUESTIONS ANSWERED APPROPRIATLY    1004-PATIENT WHEELED DOWN TO TRANSPORTING VEHICLE W/O DISTRESS.

## 2024-02-08 LAB
BACTERIA SPEC CULT: NORMAL
BACTERIA SPEC CULT: NORMAL
Lab: NORMAL
Lab: NORMAL

## 2024-02-09 NOTE — DISCHARGE SUMMARY
Discharge Summary       PATIENT ID: Javi Carbajal  MRN: [id number]   YOB: 1950    DATE OF ADMISSION: 2/2/2024 12:30 PM    DATE OF DISCHARGE: 02/08/24    PRIMARY CARE PROVIDER: Torres Nunez MD     ATTENDING PHYSICIAN: Martin Chanel MD  DISCHARGING PROVIDER: Martin Chanel MD        CONSULTATIONS: IP CONSULT TO CARDIOLOGY  IP CONSULT TO DIETITIAN  IP CONSULT TO SPIRITUAL SERVICES    PROCEDURES/SURGERIES: * No surgery found *    ADMITTING DIAGNOSES & HOSPITAL COURSE:   Javi Carbajal is a 73 y.o. male with a past medical history significant for HTN, atrial flutter/fibrillation (on Eliquis), lung cancer, liver cancer, (s/p radiation, currently on On chemo), and chronic hepatitis C, who presents to the ED today with complaints of general malaise, intermittent midsternal chest pain, productive cough with white phlegm, and shortness of breath in the last 2 to 3 weeks. Patient stated he has also had intermittent fevers off-and-on as well, however no vomiting or abdominal pain. He complains of generalized weakness, loss of appetite, and chronic weight loss.  He is actively receiving chemo for lung cancer with suspected mets to the liver, last chemo was January 8, 2024.Per ED provider, when EMS picked him up he was found with HR in the 200s. They gave him a dose of adenosine without improvement.  Upon ED arrival, he remained in SVTs rates in the 170s, s/p multiple doses of adenosine, IV fluids, Cardizem bolus, and cardioverted x 2, with some improvement.  Cardiology was consulted who recommended to start amiodarone drip.  Initial chest x-ray did not show an acute process however his CTA of his chest shows new multilobar airspace disease consistent with pneumonia, among other chronic findings of mediastinal and right hepatic masses with emphysema and apical bullae.         We agreed to inpatient admission criteria for atrial fibrillation/rapid ventricular response, sepsis, 2/2 multilobar pneumonia,

## 2024-02-21 ASSESSMENT — ENCOUNTER SYMPTOMS: SHORTNESS OF BREATH: 1
